# Patient Record
Sex: FEMALE | Race: WHITE | NOT HISPANIC OR LATINO | Employment: OTHER | URBAN - METROPOLITAN AREA
[De-identification: names, ages, dates, MRNs, and addresses within clinical notes are randomized per-mention and may not be internally consistent; named-entity substitution may affect disease eponyms.]

---

## 2017-05-07 ENCOUNTER — APPOINTMENT (OUTPATIENT)
Dept: GENERAL RADIOLOGY | Facility: CLINIC | Age: 37
End: 2017-05-07
Attending: FAMILY MEDICINE
Payer: COMMERCIAL

## 2017-05-07 ENCOUNTER — HOSPITAL ENCOUNTER (EMERGENCY)
Facility: CLINIC | Age: 37
Discharge: HOME | End: 2017-05-07
Attending: FAMILY MEDICINE | Admitting: FAMILY MEDICINE
Payer: COMMERCIAL

## 2017-05-07 VITALS
TEMPERATURE: 98.1 F | SYSTOLIC BLOOD PRESSURE: 163 MMHG | DIASTOLIC BLOOD PRESSURE: 98 MMHG | OXYGEN SATURATION: 99 % | WEIGHT: 209 LBS | RESPIRATION RATE: 16 BRPM

## 2017-05-07 DIAGNOSIS — S63.601A SPRAIN OF RIGHT THUMB, INITIAL ENCOUNTER: ICD-10-CM

## 2017-05-07 DIAGNOSIS — S69.91XA INJURY OF RIGHT HAND, INITIAL ENCOUNTER: ICD-10-CM

## 2017-05-07 DIAGNOSIS — R03.0 ELEVATED BLOOD PRESSURE READING WITHOUT DIAGNOSIS OF HYPERTENSION: ICD-10-CM

## 2017-05-07 PROCEDURE — 99213 OFFICE O/P EST LOW 20 MIN: CPT | Performed by: FAMILY MEDICINE

## 2017-05-07 PROCEDURE — 73130 X-RAY EXAM OF HAND: CPT | Mod: RT

## 2017-05-07 PROCEDURE — 99213 OFFICE O/P EST LOW 20 MIN: CPT

## 2017-05-07 RX ORDER — MELOXICAM 15 MG/1
15 TABLET ORAL DAILY
Qty: 30 TABLET | Refills: 0 | Status: SHIPPED | OUTPATIENT
Start: 2017-05-07 | End: 2017-06-06

## 2017-05-07 NOTE — ED AVS SNAPSHOT
[facility] Emergency Department    [address] [facility] [address]    Phone:  [phone]    Fax:  [phone]                                       Mirian Escalera   MRN: [id number]    Department:  [facility] Emergency Department   Date of Visit:  5/7/2017           Patient Information     Date Of Birth          1980        Your diagnoses for this visit were:     Injury of right hand, initial encounter     Sprain of right thumb, initial encounter     Elevated blood pressure reading without diagnosis of hypertension        You were seen by Fatoumata Reyes MD.      Follow-up Information     Follow up with Other Clinic, Md.    Specialty:  Clinic    Why:  As needed and fpr repeat BP check    Contact information:               Discharge Instructions       (S69.91XA) Injury of right hand, initial encounter  (S60.604O) Sprain of right thumb, initial encounter    Xray negative.   the Meloxicam and take daily with meals, while on this medication, avoid other nsaids (no aspirin, Naproxen, Advil, Alleve). The only over the counter medication you can take with this Meloxicam is Tylenol 1000mg three times a day as needed.  Ice or heat- whichever feels better.  Range of motion exercise.    Follow up as needed but please follow up with regards to your blood pressure which is elevated today.      24 Hour Appointment Hotline       To make an appointment at any [facility], call [phone] ([phone]). If you don't have a family doctor or clinic, we will help you find one. [facility] clinics are conveniently located to serve the needs of you and your family.             Review of your medicines      START taking        Dose / Directions Last dose taken    meloxicam 15 MG tablet   Commonly known as:  MOBIC   Dose:  15 mg   Quantity:  30 tablet        Take 1 tablet (15 mg) by mouth daily   Refills:  0          Our records show that you are taking the medicines listed below. If  these are incorrect, please call your family doctor or clinic.        Dose / Directions Last dose taken    LEVOTHYROXINE SODIUM PO        Take by mouth daily   Refills:  0                Prescriptions were sent or printed at these locations (1 Prescription)                   [facility] - [place], [place] - [address]   [address], [facility] [address]    Telephone:  [phone]   Fax:  [phone]   Hours:                  E-Prescribed (1 of 1)         meloxicam (MOBIC) 15 MG tablet                Procedures and tests performed during your visit     Hand XR, G/E 3 views, right      Orders Needing Specimen Collection     None      Pending Results     No orders found from 5/5/2017 to 5/8/2017.            Pending Culture Results     No orders found from 5/5/2017 to 5/8/2017.            Pending Results Instructions     If you had any lab results that were not finalized at the time of your Discharge, you can call the ED Lab Result RN at [phone]. You will be contacted by this team for any positive Lab results or changes in treatment. The nurses are available 7 days a week from 10A to 6:30P.  You can leave a message 24 hours per day and they will return your call.        Test Results From Your Hospital Stay        5/7/2017  3:14 PM      Narrative     RIGHT HAND 3 VIEWS  5/7/2017 2:47 PM    HISTORY:  Pain.    COMPARISON:  None.    FINDINGS:  No fracture or osseous lesion is seen. The joint spaces are  well preserved. No adjacent soft tissue pathology is seen.        Impression     IMPRESSION:  Unremarkable examination.    MEENAKSHI CARRION MD                Thank you for choosing [facility]       Thank you for choosing [facility] for your care. Our goal is always to provide you with excellent care. Hearing back from our patients is one way we can continue to improve our services. Please take a few minutes to complete the written survey that you may receive  "in the mail after you visit with us. Thank you!        [facility] Information     [employer] lets you send messages to your doctor, view your test results, renew your prescriptions, schedule appointments and more. To sign up, go to [email or web address] . Click on \"Log in\" on the left side of the screen, which will take you to the Welcome page. Then click on \"Sign up Now\" on the right side of the page.     You will be asked to enter the access code listed below, as well as some personal information. Please follow the directions to create your username and password.     Your access code is: [email or web address]  Expires: 2017  3:16 PM     Your access code will  in 90 days. If you need help or a new code, please call your [facility] clinic or [phone].        Care EveryWhere ID     This is your Care EveryWhere ID. This could be used by other organizations to access your [facility] medical records  [id number]        After Visit Summary       This is your record. Keep this with you and show to your community pharmacist(s) and doctor(s) at your next visit.                  "

## 2017-05-07 NOTE — ED AVS SNAPSHOT
[facility] Emergency Department    [address] [address]    Phone:  [phone]    Fax:  [phone]                                       Mirian Escalera   MRN: [id number]    Department:  [facility] Emergency Department   Date of Visit:  5/7/2017           After Visit Summary Signature Page     I have received my discharge instructions, and my questions have been answered. I have discussed any challenges I see with this plan with the nurse or doctor.    ..........................................................................................................................................  Patient/Patient Representative Signature      ..........................................................................................................................................  Patient Representative Print Name and Relationship to Patient    ..................................................               ................................................  Date                                            Time    ..........................................................................................................................................  Reviewed by Signature/Title    ...................................................              ..............................................  Date                                                            Time

## 2017-05-07 NOTE — DISCHARGE INSTRUCTIONS
(S69.91XA) Injury of right hand, initial encounter  (S63.601A) Sprain of right thumb, initial encounter    Xray negative.   the Meloxicam and take daily with meals, while on this medication, avoid other nsaids (no aspirin, Naproxen, Advil, Alleve). The only over the counter medication you can take with this Meloxicam is Tylenol 1000mg three times a day as needed.  Ice or heat- whichever feels better.  Range of motion exercise.    Follow up as needed but please follow up with regards to your blood pressure which is elevated today.

## 2017-05-07 NOTE — ED PROVIDER NOTES
History     Chief Complaint   Patient presents with     Hand Pain     after contact with tree last pm, bent right thumb back     HPI  Mirian Escalera is a 36 year old female who presents for right hand pain which started last night she slipped and caught her thumb on a branch. The thumb was bent backwards. Immediate pain and swelling and bruising especially over her thumb region. Today she noticed a small area that still seemed swollen over her thumb. Currently pain is about 6/10, up to 8/10 once she started to ove it.  She has been icing it, elevating, taking ibuprofen as needed- helped initially with  swelling but she is here as she is still in pain.  No specific injuries to this hand but she reports being accident prone.      Blood pressure noted to be elevated. No diagnosis of hypertension but patient reports that she has white coat hypertension and her PCP has been monitoring this.    I have reviewed the Medications, Allergies, Past Medical and Surgical History, and Social History in the Epic system.    Review of Systems   Pertinent aspects as in the history.     Physical Exam   BP: (!) 163/98  Heart Rate: 77  Temp: 98.1  F (36.7  C)  Resp: 16  Weight: 94.8 kg (209 lb)  SpO2: 99 %  Physical Exam   General - Awake and alert, not in any obvious distress. Afebrile, not pale well hydrated.  Head - Normocephalic, atraumatic.  Musculoskeletal: Upper Extremities - no obvious deformity.  Left upper extremity- essentially normal  Right hand- mild swelling over thenar eminence with associated tenderness over this region, herminio range of motion off all finger though with caution with flexion of thumb due to pain.  Psych - Judgment and mental status are clear, patient has reasonable insight. Mood is stable.        ED Course     ED Course     Procedures            Xray Right hand: IMPRESSION: Unremarkable examination.       Labs Ordered and Resulted from Time of ED Arrival Up to the Time of Departure from the ED - No data  to display    Assessments & Plan (with Medical Decision Making)     I have reviewed the nursing notes.    I have reviewed the findings, diagnosis, plan and need for follow up with the patient.    New Prescriptions    MELOXICAM (MOBIC) 15 MG TABLET    Take 1 tablet (15 mg) by mouth daily       Final diagnoses:   Injury of right hand, initial encounter   Sprain of right thumb, initial encounter       Diagnosis, differential diagnosis, treatment and prognosis discussed with patient. Will follow up with PCp with regards to elevated blood pressure.    See below for summary discussion with patient      the Meloxicam and take daily with meals, while on this medication, avoid other nsaids (no aspirin, Naproxen, Advil, Alleve). The only over the counter medication you can take with this Meloxicam is Tylenol 1000mg three times a day as needed.  Ice or heat- whichever feels better.  Range of motion exercise.    Follow up as needed but please follow up with regards to your blood pressure which is elevated today.    5/7/2017   [facility] EMERGENCY DEPARTMENT     Fatoumata Reyes MD  05/07/17 5430

## 2021-05-28 ENCOUNTER — RECORDS - HEALTHEAST (OUTPATIENT)
Dept: ADMINISTRATIVE | Facility: CLINIC | Age: 41
End: 2021-05-28

## 2021-05-29 ENCOUNTER — RECORDS - HEALTHEAST (OUTPATIENT)
Dept: ADMINISTRATIVE | Facility: CLINIC | Age: 41
End: 2021-05-29

## 2021-06-01 ENCOUNTER — RECORDS - HEALTHEAST (OUTPATIENT)
Dept: ADMINISTRATIVE | Facility: CLINIC | Age: 41
End: 2021-06-01

## 2021-08-24 ENCOUNTER — HOSPITAL ENCOUNTER (EMERGENCY)
Facility: CLINIC | Age: 41
Discharge: HOME | End: 2021-08-24
Attending: FAMILY MEDICINE | Admitting: FAMILY MEDICINE
Payer: COMMERCIAL

## 2021-08-24 VITALS
TEMPERATURE: 98.3 F | HEIGHT: 62 IN | DIASTOLIC BLOOD PRESSURE: 110 MMHG | OXYGEN SATURATION: 95 % | SYSTOLIC BLOOD PRESSURE: 173 MMHG | HEART RATE: 91 BPM | RESPIRATION RATE: 18 BRPM | BODY MASS INDEX: 42.33 KG/M2 | WEIGHT: 230 LBS

## 2021-08-24 DIAGNOSIS — S00.262A INSECT BITE OF LEFT PERIOCULAR AREA, INITIAL ENCOUNTER: ICD-10-CM
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
## 2021-08-24 PROCEDURE — 99284 EMERGENCY DEPT VISIT MOD MDM: CPT | Performed by: FAMILY MEDICINE

## 2021-08-24 PROCEDURE — 99283 EMERGENCY DEPT VISIT LOW MDM: CPT | Performed by: FAMILY MEDICINE

## 2021-08-24 RX ORDER — PREDNISONE 20 MG/1
20 TABLET ORAL 2 TIMES DAILY
Qty: 6 TABLET | Refills: 0 | Status: SHIPPED | OUTPATIENT
Start: 2021-08-24 | End: 2021-08-27

## 2021-08-24 ASSESSMENT — MIFFLIN-ST. JEOR: SCORE: 1661.52

## 2021-08-24 NOTE — ED PROVIDER NOTES
HPI   The patient is a 40-year-old female presenting with concern about a horse fly bite.  She has had similar previous with extensive local reaction.  She was bit 4 days ago.  Location is her left periorbital region, just above the eyebrow.  She has pain but it is not increasing.  She has increased swelling, redness.  No visual changes.  She does have a headache.  No fever.  No nausea or vomiting.  No lightheadedness or fainting.        Allergies:  Allergies   Allergen Reactions     Levofloxacin Unknown     Problem List:    Patient Active Problem List    Diagnosis Date Noted     Migraine Headache      Priority: Medium     Created by Conversion  Replacement Utility updated for latest IMO load         Vitamin D Deficiency      Priority: Medium     Created by Conversion  Replacement Utility updated for latest IMO load         Hyperlipidemia      Priority: Medium     Created by Conversion         Obesity      Priority: Medium     Created by Conversion         Fatigue      Priority: Medium     Created by Conversion          Past Medical History:    No past medical history on file.  Past Surgical History:    Past Surgical History:   Procedure Laterality Date     OTHER SURGICAL HISTORY      none     Family History:    Family History   Problem Relation Age of Onset     Hypertension Mother      Hypertension Father      Heart Disease Maternal Grandfather      Cerebrovascular Disease Mother      Thyroid Disease Mother      Factor V Leiden deficiency Mother      Factor V Leiden deficiency Maternal Grandfather      Social History:  Marital Status:   [2]  Social History     Tobacco Use     Smoking status: Never Smoker   Substance Use Topics     Alcohol use: Yes     Comment: occasional     Drug use: No      Medications:    predniSONE (DELTASONE) 20 MG tablet  LEVOTHYROXINE SODIUM PO  meloxicam (MOBIC) 15 MG tablet      Review of Systems   All other systems reviewed and are negative.      PE   BP: (!) 173/110  Pulse:  "91  Temp: 98.3  F (36.8  C)  Resp: 18  Height: 157.5 cm (5' 2\")  Weight: 104.3 kg (230 lb)  SpO2: 95 %  Physical Exam  Vitals reviewed.   Constitutional:       General: She is not in acute distress.     Appearance: She is well-developed.   HENT:      Head: Normocephalic and atraumatic.      Right Ear: External ear normal.      Left Ear: External ear normal.      Nose: Nose normal.      Mouth/Throat:      Mouth: Mucous membranes are moist.      Pharynx: Oropharynx is clear.   Eyes:      Extraocular Movements: Extraocular movements intact.      Conjunctiva/sclera: Conjunctivae normal.      Pupils: Pupils are equal, round, and reactive to light.   Cardiovascular:      Rate and Rhythm: Normal rate and regular rhythm.   Pulmonary:      Effort: Pulmonary effort is normal.   Musculoskeletal:         General: Normal range of motion.      Cervical back: Normal range of motion.   Skin:     General: Skin is warm and dry.      Comments: The patient has an obvious bite just above the left eyebrow.  There is associated swelling extending down toward the eye and periorbital region.   Neurological:      Mental Status: She is alert and oriented to person, place, and time.   Psychiatric:         Behavior: Behavior normal.         ED COURSE and MDM   0919.  Local swelling and tenderness.  No fever.  Low concern for cellulitis given the timing and lack of systemic symptoms.  She does not have increasing pain or tenderness but it has been constant.  Prednisone 20 mg twice a day for 3 days.  No antibiotics at this time.    LABS  Labs Ordered and Resulted from Time of ED Arrival Up to the Time of Departure from the ED - No data to display    IMAGING  Images reviewed by me.  Radiology report also reviewed.  No orders to display       Procedures    Medications - No data to display      IMPRESSION       ICD-10-CM    1. Insect bite of left periocular area, initial encounter  S00.262A     W57.XXXA             Medication List      Started  "   predniSONE 20 MG tablet  Commonly known as: DELTASONE  20 mg, Oral, 2 TIMES DAILY                          Schuyler Montero MD  08/24/21 0919

## 2021-08-24 NOTE — DISCHARGE INSTRUCTIONS
Return to the Emergency Room if the following occurs:     Fever >101, increasing pain and tenderness, or for any concern at anytime.    Or, follow-up with the following provider as we discussed:     Return to your primary doctor as needed.    Medications discussed:    Prednisone.    If you received pain-relieving or sedating medication during your time in the ER, avoid alcohol, driving automobiles, or working with machinery.  Also, a responsible adult must stay with you.        Call the Nurse Advice Line at [phone] or [phone] for any concern at anytime.

## 2021-10-09 ENCOUNTER — HEALTH MAINTENANCE LETTER (OUTPATIENT)
Age: 41
End: 2021-10-09

## 2022-09-17 ENCOUNTER — HEALTH MAINTENANCE LETTER (OUTPATIENT)
Age: 42
End: 2022-09-17

## 2023-01-23 ENCOUNTER — HEALTH MAINTENANCE LETTER (OUTPATIENT)
Age: 43
End: 2023-01-23

## 2024-02-24 ENCOUNTER — HEALTH MAINTENANCE LETTER (OUTPATIENT)
Age: 44
End: 2024-02-24

## 2024-11-04 NOTE — PROGRESS NOTES
"NEW PATIENT OUTPATIENT VISIT     Chief complaint/Reason for visit: Endometriosis    HPI: 42yo G0 presents to discuss possible endometriosis symptoms and management options.  She reports first noticing some pelvic pain after she had a Kyleena IUD placed in 2018.  IUD has since been removed.  Patient is currently taking Seasonale to suppress her periods though she has been getting more breakthrough spotting in recent months.  She describes pain that worsens around the time she \"should\" be getting her period but she really has pain all the time.  She describes lower abdominal pain that shoots into her bladder, uterus, and rectum.  She also reports pain during and after intercourse.  She also reports nausea and bloating at times.    Patient reports a strong family history of endometriosis-- mother and sister.  Mother had hyst at patient's age.  Sister with hyst age 30s.    OBHx: G0  Gyn Hx: No abnormal Paps.  No other Gyn issues prior to this.    PMH: Fatty liver, HTN.    Last Pap 12/3/21 NIL, HPV negative.  ------------------------------------------------------------------------  Review of Systems:     With the exception of any items noted in HPI, the remainder of the GI and  ROS is negative.    ------------------------------------------------------------------------    The medical, surgical, social and family histories were reviewed and updated.     ------------------------------------------------------------------------  Physical Exam:    Constitutional:   - BP (!) 134/100 (BP Location: Left arm, Patient Position: Sitting, Cuff Size: Adult Large)   Pulse 98   Temp 99  F (37.2  C)   Ht 1.575 m (5' 2\")   Wt 97 kg (213 lb 14.4 oz)   BMI 39.12 kg/m    - General Appearance: pleasant, well-appearing, NAD    Cardiovascular:  - Extremities: no edema or calf tenderness    Genitourinary/Female Genitalia:  - EGBUS: normal-appearing external genitalia and perineum  - Vagina: normal vaginal mucosa and discharge  - Cervix: " without lesions.  Pap sent.  - Bladder/Urethra: nontender, without masses  - Uterus: midline, mobile, nontender, not enlarged  - Adnexa: no adnexal masses or tenderness appreciated    Abdomen/GI:  - Perineum/Anus/Rectum: normal appearing, no hemorrhoids    ------------------------------------------------------------------------  Labs/Studies Reviewed:     10/1/24 Pelvic US:     Impression    1.  Cervical canal appears somewhat heterogeneous without increased color Doppler vascularity, suggestive of the presence of a small amount of blood products. Clinical correlation recommended.  2.  Otherwise normal examination.  Narrative    For Patients: As a result of the 21st Century Cures Act, medical imaging exams and procedure reports are released immediately into your electronic medical record. You may view this report before your referring provider. If you have questions, please contact your health care provider.    EXAM: US PELVIS COMPLETE TA AND TV WITH DUPLEX  [facility]  DATE: 10/1/2024    INDICATION: Pelvic Pain  COMPARISON: 10/24/2018.  TECHNIQUE: Transabdominal scans were performed. Endovaginal ultrasound was performed to better visualize the adnexa. Color flow with spectral Doppler and waveform analysis performed.    FINDINGS:    UTERUS: 8.3 x 5.6 x 4.6 cm. Normal in size and position with no masses.    ENDOMETRIUM: 2 mm. Normal smooth endometrium. Cervical canal measures up to 7 mm wide and has somewhat heterogeneous texture without increased color Doppler vascularity, findings suggestive of the presence of a small amount of blood products.    RIGHT OVARY: 3.4 x 2.7 x 2.3 cm. 2.4 cm simple cyst consistent with a benign dominant follicle. Normal arterial and venous duplex flow identified.    LEFT OVARY: 1.9 x 1.8 x 1.4 cm. Normal with arterial and venous duplex flow identified.    Small amount of free pelvic fluid within physiologic range.    10/24/2018 Pelvic US:      Examination:  Ultrasound pelvis complete TA and TV     Indication: Pelvic pain in female     Technique:   Routine transabdominal and transvaginal pelvic sonographic examination with color and Doppler flow where appropriate.     Comparison: Pelvic ultrasound from 7/10/2018.     Findings:    Uterus Size:  9.1 x 3.3 x 4.3 cm.   Uterus: Mildly heterogeneous in appearance with a 0.6 x 0.6 x 0.9 cm posterior fundal fibroid, similar to prior exam.   Endometrial thickness : The thickness measures approximately 1.2 cm, within normal limits in a patient of this age. Echogenic shadowing IUD identified in appropriate position.     Right ovary size:  2.7 x 1.5 x 1.6 cm.   Right adnexa: Contains a 2.1 x 1.8 x 1.6 cm right-sided paraovarian cyst, previously measuring 1.9 x 1.7 x 1.9 cm.     Left ovary size:  3.2 x 2.2 x 2.0 cm.   Left ovary: Contains a 1.6 x 1.9 x 1.4 cm left ovarian cyst with smaller daughter cyst within it.     Free fluid: None.     Impression:    Right-sided paraovarian cyst as above.   Endometrial stripe is within normal limits with IUD in place.     ------------------------------------------------------------------------  Assessment/Plan:  43 year old with chronic pelvic pain and strong family history of endometriosis.  -- Pain inadequately controlled with COCs.  -- Discussed alternative options for medical management of symptoms:      1) Progesterone only methods: had pain with Kyleena IUD so not a good candidate for another IUD.  Discussed POPs.       2) Orilissa-- reviewed mechanism of action, duration of use, potential side effects.  -- Discussed definitive surgical management with hysterectomy.  Patient has been thinking about this and would like to proceed.  Discussed hysterectomy with bilateral salpingectomy, +/- bilateral oophorectomy (reviewed pros/cons and she is leaning towards removal of ovaries).  -- Discussed with patient that I am not currently operating, referred her to one of my partners.  Patient is  traveling to [place] on January 19 for 3 weeks so will likely schedule procedure for after she returns.  -- HTN: Patient with history of white coat HTN so Bps always worse in clinic.  She is currently being managed by [facility] PCP for HTN and has recently been started on medication.  Has follow up scheduled with PCP within 2 weeks.  I discussed that I would normally recommend discontinuing estrogen containing pills with poorly controlled HTN due to stroke risk.  However, I believe there is a high risk of worsening patient's pelvic pain if I try to switch her off COCs at this time.  I emphasized to the patient that if she continues COCs, it is extremely important that she monitors her BP at home and has close follow up with her PCP to ensure BP remains within target range.  If BP ends up being difficult to manage I would recommend stopping COCs and switching over to a progesterone only pill to cover her until hysterectomy can be performed.  Patient expressed understanding.  -- Pap sent today.    Geneva Whyte MD

## 2024-11-05 ENCOUNTER — OFFICE VISIT (OUTPATIENT)
Dept: OBGYN | Facility: CLINIC | Age: 44
End: 2024-11-05
Payer: COMMERCIAL

## 2024-11-05 VITALS
TEMPERATURE: 99 F | HEART RATE: 98 BPM | HEIGHT: 62 IN | BODY MASS INDEX: 39.36 KG/M2 | WEIGHT: 213.9 LBS | SYSTOLIC BLOOD PRESSURE: 134 MMHG | DIASTOLIC BLOOD PRESSURE: 100 MMHG

## 2024-11-05 DIAGNOSIS — Z12.4 CERVICAL CANCER SCREENING: ICD-10-CM

## 2024-11-05 DIAGNOSIS — N80.9 ENDOMETRIOSIS: ICD-10-CM

## 2024-11-05 DIAGNOSIS — E66.01 CLASS 2 SEVERE OBESITY DUE TO EXCESS CALORIES WITH SERIOUS COMORBIDITY AND BODY MASS INDEX (BMI) OF 39.0 TO 39.9 IN ADULT (H): ICD-10-CM

## 2024-11-05 DIAGNOSIS — G89.29 CHRONIC PELVIC PAIN IN FEMALE: Primary | ICD-10-CM

## 2024-11-05 DIAGNOSIS — I10 HYPERTENSION, UNSPECIFIED TYPE: ICD-10-CM

## 2024-11-05 DIAGNOSIS — E66.812 CLASS 2 SEVERE OBESITY DUE TO EXCESS CALORIES WITH SERIOUS COMORBIDITY AND BODY MASS INDEX (BMI) OF 39.0 TO 39.9 IN ADULT (H): ICD-10-CM

## 2024-11-05 DIAGNOSIS — R10.2 CHRONIC PELVIC PAIN IN FEMALE: Primary | ICD-10-CM

## 2024-11-05 LAB
HPV HR 12 DNA CVX QL NAA+PROBE: NEGATIVE
HPV16 DNA CVX QL NAA+PROBE: NEGATIVE
HPV18 DNA CVX QL NAA+PROBE: NEGATIVE
HUMAN PAPILLOMA VIRUS FINAL DIAGNOSIS: NORMAL

## 2024-11-05 PROCEDURE — 87624 HPV HI-RISK TYP POOLED RSLT: CPT | Performed by: OBSTETRICS & GYNECOLOGY

## 2024-11-05 PROCEDURE — G0145 SCR C/V CYTO,THINLAYER,RESCR: HCPCS | Performed by: OBSTETRICS & GYNECOLOGY

## 2024-11-05 PROCEDURE — 99459 PELVIC EXAMINATION: CPT | Performed by: OBSTETRICS & GYNECOLOGY

## 2024-11-05 PROCEDURE — 99203 OFFICE O/P NEW LOW 30 MIN: CPT | Performed by: OBSTETRICS & GYNECOLOGY

## 2024-11-05 RX ORDER — DESOGESTREL AND ETHINYL ESTRADIOL 0.15-0.03
1 KIT ORAL DAILY
COMMUNITY

## 2024-11-05 RX ORDER — SUMATRIPTAN 50 MG/1
50 TABLET, FILM COATED ORAL
COMMUNITY
Start: 2024-01-09

## 2024-11-08 LAB
BKR AP ASSOCIATED HPV REPORT: NORMAL
BKR LAB AP GYN ADEQUACY: NORMAL
BKR LAB AP GYN INTERPRETATION: NORMAL
BKR LAB AP PREVIOUS ABNORMAL: NORMAL
PATH REPORT.COMMENTS IMP SPEC: NORMAL
PATH REPORT.COMMENTS IMP SPEC: NORMAL
PATH REPORT.RELEVANT HX SPEC: NORMAL

## 2024-12-09 ENCOUNTER — TELEPHONE (OUTPATIENT)
Dept: OBGYN | Facility: CLINIC | Age: 44
End: 2024-12-09

## 2024-12-09 ENCOUNTER — PREP FOR PROCEDURE (OUTPATIENT)
Dept: OBGYN | Facility: CLINIC | Age: 44
End: 2024-12-09

## 2024-12-09 ENCOUNTER — OFFICE VISIT (OUTPATIENT)
Dept: OBGYN | Facility: CLINIC | Age: 44
End: 2024-12-09
Payer: COMMERCIAL

## 2024-12-09 VITALS
WEIGHT: 215.1 LBS | SYSTOLIC BLOOD PRESSURE: 126 MMHG | DIASTOLIC BLOOD PRESSURE: 72 MMHG | RESPIRATION RATE: 16 BRPM | TEMPERATURE: 97.8 F | HEIGHT: 62 IN | BODY MASS INDEX: 39.58 KG/M2

## 2024-12-09 DIAGNOSIS — N93.9 ABNORMAL UTERINE BLEEDING: ICD-10-CM

## 2024-12-09 DIAGNOSIS — I10 HYPERTENSION, UNSPECIFIED TYPE: Primary | ICD-10-CM

## 2024-12-09 DIAGNOSIS — N80.9 ENDOMETRIOSIS: Primary | ICD-10-CM

## 2024-12-09 LAB
HCG UR QL: NEGATIVE
INTERNAL QC OK POCT: NORMAL
POCT KIT EXPIRATION DATE: NORMAL
POCT KIT LOT NUMBER: NORMAL

## 2024-12-09 PROCEDURE — 88305 TISSUE EXAM BY PATHOLOGIST: CPT | Performed by: PATHOLOGY

## 2024-12-09 PROCEDURE — 99459 PELVIC EXAMINATION: CPT | Performed by: OBSTETRICS & GYNECOLOGY

## 2024-12-09 PROCEDURE — 99215 OFFICE O/P EST HI 40 MIN: CPT | Mod: 25 | Performed by: OBSTETRICS & GYNECOLOGY

## 2024-12-09 PROCEDURE — 81025 URINE PREGNANCY TEST: CPT | Performed by: OBSTETRICS & GYNECOLOGY

## 2024-12-09 PROCEDURE — 58100 BIOPSY OF UTERUS LINING: CPT | Performed by: OBSTETRICS & GYNECOLOGY

## 2024-12-09 RX ORDER — LOSARTAN POTASSIUM 50 MG/1
50 TABLET ORAL DAILY
COMMUNITY

## 2024-12-09 RX ORDER — LEVONORGESTREL AND ETHINYL ESTRADIOL 0.15-0.03
1 KIT ORAL DAILY
COMMUNITY

## 2024-12-09 NOTE — PROGRESS NOTES
"Initial /72 (BP Location: Right arm, Patient Position: Chair, Cuff Size: Adult Regular)   Temp 97.8  F (36.6  C) (Tympanic)   Resp 16   Ht 1.575 m (5' 2\")   Wt 97.6 kg (215 lb 1.6 oz)   LMP  (LMP Unknown)   BMI 39.34 kg/m   Estimated body mass index is 39.34 kg/m  as calculated from the following:    Height as of this encounter: 1.575 m (5' 2\").    Weight as of this encounter: 97.6 kg (215 lb 1.6 oz). .  "

## 2024-12-09 NOTE — PROGRESS NOTES
[facility]  OB/GYN Clinic   Gynecology Consult Note    CC:     Chief Complaint   Patient presents with    Consult     Hysterectomy w/ suspected endometriosis       HPI: Ms. Arreguin is a 44 year old G0 with AUB and dysmenorrhea.   Has significant AUB and dysmenorrhea for years. See initial consult with my colleague Dr. Whyte.   Kyleena - breakthrough bleeding  Currently using COCPs, getting breakthrough spotting. Pelvic pain in lower abdominal, has pain that shoots into her vagina , bladder uterus and rectum. Pain during and after intercourse.     Has random nausea.     GYN Hx: Patient reports a strong family history of endometriosis-- mother and sister.  Mother had hyst at patient's age.  Sister with hyst age 30s.    Pt also desires hysterectomy.      OBHx: G0  Gyn Hx: No abnormal Paps.  No other Gyn issues prior to this.      ROS: A 10 pt ROS was completed and found to be otherwise negative unless mentioned in the HPI.     PMH:   HTN  Hypothyroidism   Migraine hx     PSHx:   Past Surgical History:   Procedure Laterality Date    OTHER SURGICAL HISTORY      none       OBHx:   OB History   No obstetric history on file.   G0    Medications:   Current Outpatient Medications   Medication Sig Dispense Refill    LEVOTHYROXINE SODIUM PO Take by mouth daily      losartan (COZAAR) 50 MG tablet Take 50 mg by mouth daily.      SETLAKIN 0.15-0.03 MG tablet Take 1 tablet by mouth daily.      SUMAtriptan (IMITREX) 50 MG tablet Take 50 mg by mouth at onset of headache.      meloxicam (MOBIC) 15 MG tablet Take 1 tablet (15 mg) by mouth daily 30 tablet 0     No current facility-administered medications for this visit.       Allergies:      Allergies   Allergen Reactions    Levofloxacin Unknown       Social History:  Stays at home   Social History     Socioeconomic History    Marital status:      Spouse name: Not on file    Number of children: Not on file    Years of education: Not on file    Highest  education level: Not on file   Occupational History    Not on file   Tobacco Use    Smoking status: Never    Smokeless tobacco: Not on file   Vaping Use    Vaping status: Never Used   Substance and Sexual Activity    Alcohol use: Yes     Comment: occasional    Drug use: No    Sexual activity: Not on file   Other Topics Concern    Not on file   Social History Narrative    Not on file     Social Drivers of Health     Financial Resource Strain: High Risk (1/1/2022)    [facility]    Financial Resource Strain     Difficulty of Paying Living Expenses: Not on file     Difficulty of Paying Living Expenses: Not on file   Food Insecurity: Not on file   Transportation Needs: Not on file   Physical Activity: Not on file   Stress: Not on file   Social Connections: Not on file   Interpersonal Safety: Not on file   Housing Stability: Not on file     Social History     Socioeconomic History    Marital status:      Spouse name: None    Number of children: None    Years of education: None    Highest education level: None   Tobacco Use    Smoking status: Never   Vaping Use    Vaping status: Never Used   Substance and Sexual Activity    Alcohol use: Yes     Comment: occasional    Drug use: No     Social Drivers of Health      Received from [facility]    Financial Resource Strain       Family History:   Family History   Problem Relation Age of Onset    Hypertension Mother     Cerebrovascular Disease Mother     Thyroid Disease Mother     Factor V Leiden deficiency Mother     Endometriosis Mother     Hypertension Father     Heart Disease Maternal Grandfather     Factor V Leiden deficiency Maternal Grandfather     Endometriosis Sister        Physical Exam:   Vitals:    12/09/24 1024   BP: 126/72   BP Location: Right arm   Patient Position: Chair   Cuff Size: Adult Regular   Resp: 16   Temp: 97.8  F (36.6  C)   TempSrc: Tympanic   Weight: 97.6 kg (215  "lb 1.6 oz)   Height: 1.575 m (5' 2\")      Estimated body mass index is 39.34 kg/m  as calculated from the following:    Height as of this encounter: 1.575 m (5' 2\").    Weight as of this encounter: 97.6 kg (215 lb 1.6 oz).    Gen: Pleasant, talkative female in no apparent distress   Respiratory: breathing comfortably on room air   Cardiac: Regular rate, warm and well-perfused.   GI: Abd soft and non-tender  : External genitalia is free of lesion. Urethra and bartholin glands normal.  Vaginal mucosa is moist and pink without unusual discharge.  Cervix is without lesions or discharge. Bimanual exam reveals mobile anteverted uterus without cervical motion tenderness.  Adenexa are mobile and non-tender bilaterally. No appreciable adnexal enlargement.   Rectal: no masses or hemorrhoids visually appreciated  Derm: no acanthosis nigricans, ance or facial/back/abdominal hair growth patterns   MSK: Grossly normal movement of all four extremities  Psych: mood and affect bright   Lower extremity: edema not present     Labs/Imaging:   EXAM: US PELVIS COMPLETE TA AND TV WITH DUPLEX   LOCATION: [facility]   DATE: 10/1/2024     INDICATION: Pelvic Pain   COMPARISON: 10/24/2018.   TECHNIQUE: Transabdominal scans were performed. Endovaginal ultrasound was performed to better visualize the adnexa. Color flow with spectral Doppler and waveform analysis performed.     FINDINGS:     UTERUS: 8.3 x 5.6 x 4.6 cm. Normal in size and position with no masses.     ENDOMETRIUM: 2 mm. Normal smooth endometrium. Cervical canal measures up to 7 mm wide and has somewhat heterogeneous texture without increased color Doppler vascularity, findings suggestive of the presence of a small amount of blood products.     RIGHT OVARY: 3.4 x 2.7 x 2.3 cm. 2.4 cm simple cyst consistent with a benign dominant follicle. Normal arterial and venous duplex flow identified.     LEFT OVARY: 1.9 x 1.8 x 1.4 cm. Normal with arterial and venous " duplex flow identified.       A&P: 43 yo G0 who presents to discussed dysmenorrhea and abnormal uterine bleeding (breakthrough bleeding on COCPs).   Discussed that her clinical picture is certainly consistent with endometriosis. Discussed definitive diagnosis with diagnostic laparoscopy with pathologic diagnosis. Discussed my typical practice of empiric treatment prior to laparoscopy.   Discussed medical treatment options including depo provera, OCPs, hormonal patch and hormonal ring including side effect profile and bleeding patterns. I discussed LARC options of Mirena IUD and the Nexplanon. Discussed placement, expected bleeding profiles, side effect profile, efficacy as contraception and reversibility. Pt has HTN, so would recommend any estrogen-containing treatments, so needs to get off COCPs. I discussed depo lupron and orlissa including pathophysiology and need for add back therapy.     Additionally, I reviewed the option of definitive treatment with a hysterectomy. I discussed my typical approach of minimally-invasive surgery, and that I think she would be a candidate for a laparoscopic-assisted vaginal hysterectomy, bilateral salpingectomy and cystoscopy. I reviewed oophorectomy versus ovarian conservation. I discussed the risks of re-operation with endometriosis versus the risks to bone and heart health with oophorectomy. I discussed the steps of the procedure in detail as well as the expected recovery. Discussed same day surgery expectations. Additionally, I reviewed the risks of bleeding, infection and recognized and unrecognized intraabdominal injury. I discussed the risk of needing laparoscopy or laparotomy. Discussed need for pre-op clearance.     After this discussion, the patient would like to proceed with  laparoscopic-assisted vaginal hysterectomy, bilateral salpingectomy and cystoscopy.     Recommended endometrial biopsy to rule out hyperplasia or malignancy given breakthrough bleeding on OCPs and  obesity risk factor.     [facility] Endometrial Biopsy Procedure Note    Mirian Escalera  1980  [id number]    The patient was counseled on the risks (including risk of infection, bleeding, pain). Verbal and written consent were obtained.     Technique: The patient was placed in the dorsal lithotomy position.  A speculum was placed in the vagina and the cervix visualized. The cervix was cleaned with betadine swabs x3. The rocket curet was passed through the cervix to the fundus with return of scant but adequate tissue. This was placed in specimen jar and sent for permanent pathology. All instruments were removed.  The patient tolerated the procedure well.      I spent 40 min with the patient and this does not include the time for the procedure. I spent an additional 10 min in documentation and chart review.     Randee Ohara MD  OB/GYN  12/9/2024

## 2024-12-09 NOTE — TELEPHONE ENCOUNTER
"[id number]  Mirian Escalera    You are now scheduled for surgery at [facility].  Below are the details for your surgery.  Please read the \"Preparing for Your Surgery\" instructions and let us know if you have any questions.    Type of surgery: HYSTERECTOMY, VAGINAL, LAPAROSCOPY-ASSISTED, bilateral salpingectomy, removal of endometriosis, cystoscopy   Surgeon:  Randee Ohara MD  Location of surgery: [facility] OR    Date of surgery: 2/13/25    Time: 7:30am   Arrival Time: 6:00am    Time can change, to be confirmed a couple of days prior by pre-op surgery nurse.    Pre-Op Appt Date: Patient to schedule with a PCP or Family Practice Provider within 30 days to the surgery.  Post-Op Appt Date: 4weeks   Time:     Packet sent out: Yes  Pre-cert/Authorization completed:  TBD by Financial Securing Office.   MA Sterilization/Hysterectomy Acknowledgment Consent signed: Not Applicable    [facility]  [phone]    Fax: [phone]  Same Day Surgery [phone]  Fax: [phone]  [facility] [phone]    "

## 2024-12-12 LAB
PATH REPORT.COMMENTS IMP SPEC: NORMAL
PATH REPORT.COMMENTS IMP SPEC: NORMAL
PATH REPORT.FINAL DX SPEC: NORMAL
PATH REPORT.GROSS SPEC: NORMAL
PATH REPORT.MICROSCOPIC SPEC OTHER STN: NORMAL
PATH REPORT.RELEVANT HX SPEC: NORMAL
PHOTO IMAGE: NORMAL

## 2025-02-12 ENCOUNTER — ANESTHESIA EVENT (OUTPATIENT)
Dept: SURGERY | Facility: CLINIC | Age: 45
End: 2025-02-12
Payer: COMMERCIAL

## 2025-02-12 NOTE — ANESTHESIA PREPROCEDURE EVALUATION
"Anesthesia Pre-Procedure Evaluation    Patient: Mirian Escalera   MRN: [id number] : 1980        Procedure : Procedure(s):  HYSTERECTOMY, VAGINAL, LAPAROSCOPY-ASSISTED, bilateral salpingectomy, removal of endometriosis, cystoscopy          No past medical history on file.   Past Surgical History:   Procedure Laterality Date    OTHER SURGICAL HISTORY      none      Allergies   Allergen Reactions    Levofloxacin Unknown      Social History     Tobacco Use    Smoking status: Never    Smokeless tobacco: Not on file   Substance Use Topics    Alcohol use: Yes     Comment: occasional      Wt Readings from Last 1 Encounters:   24 97.6 kg (215 lb 1.6 oz)        Anesthesia Evaluation            ROS/MED HX  ENT/Pulmonary:       Neurologic: Comment: Migraine with aura and without status migrainosus, not intractable    (+)      migraines,                          Cardiovascular:     (+) Dyslipidemia hypertension- -   -  - -                                      METS/Exercise Tolerance:     Hematologic:       Musculoskeletal:       GI/Hepatic: Comment: Fatty liver disease    (+)             liver disease,       Renal/Genitourinary:       Endo:     (+)          thyroid problem, hypothyroidism,    Obesity,       Psychiatric/Substance Use:       Infectious Disease:       Malignancy:       Other:            Physical Exam    Airway  airway exam normal      Mallampati: II   TM distance: > 3 FB   Neck ROM: full   Mouth opening: > 3 cm    Respiratory Devices and Support         Dental       (+) Completely normal teeth      Cardiovascular   cardiovascular exam normal       Rhythm and rate: regular and normal     Pulmonary   pulmonary exam normal        breath sounds clear to auscultation           OUTSIDE LABS:  CBC: No results found for: \"WBC\", \"HGB\", \"HCT\", \"PLT\"  BMP: No results found for: \"NA\", \"POTASSIUM\", \"CHLORIDE\", \"CO2\", \"BUN\", \"CR\", \"GLC\"  COAGS: No results found for: \"PTT\", \"INR\", \"FIBR\"  POC:   Lab Results " "  Component Value Date    HCG Negative 12/09/2024     HEPATIC: No results found for: \"ALBUMIN\", \"PROTTOTAL\", \"ALT\", \"AST\", \"GGT\", \"ALKPHOS\", \"BILITOTAL\", \"BILIDIRECT\", \"ANJELICA\"  OTHER:   Lab Results   Component Value Date    A1C 5.1 10/26/2010       Anesthesia Plan    ASA Status:  2    NPO Status:  NPO Appropriate    Anesthesia Type: General.     - Airway: ETT   Induction: Intravenous, Propofol.   Maintenance: TIVA.        Consents    Anesthesia Plan(s) and associated risks, benefits, and realistic alternatives discussed. Questions answered and patient/representative(s) expressed understanding.     - Discussed: Risks, Benefits and Alternatives for BOTH SEDATION and the PROCEDURE were discussed     - Discussed with:  Patient      - Extended Intubation/Ventilatory Support Discussed: No.      - Patient is DNR/DNI Status: No     Use of blood products discussed: Yes.     - Discussed with: Patient.     Postoperative Care       PONV prophylaxis: Ondansetron (or other 5HT-3), Dexamethasone or Solumedrol, Background Propofol Infusion     Comments:               LYDNA Metz CRNA    I have reviewed the pertinent notes and labs in the chart from the past 30 days and (re)examined the patient.  Any updates or changes from those notes are reflected in this note.    Clinically Significant Risk Factors Present on Admission                                          "

## 2025-02-13 ENCOUNTER — HOSPITAL ENCOUNTER (OUTPATIENT)
Facility: CLINIC | Age: 45
Discharge: HOME | End: 2025-02-13
Attending: OBSTETRICS & GYNECOLOGY | Admitting: OBSTETRICS & GYNECOLOGY
Payer: COMMERCIAL

## 2025-02-13 ENCOUNTER — ANESTHESIA (OUTPATIENT)
Dept: SURGERY | Facility: CLINIC | Age: 45
End: 2025-02-13
Payer: COMMERCIAL

## 2025-02-13 VITALS
OXYGEN SATURATION: 98 % | DIASTOLIC BLOOD PRESSURE: 99 MMHG | RESPIRATION RATE: 16 BRPM | SYSTOLIC BLOOD PRESSURE: 144 MMHG | HEART RATE: 95 BPM | TEMPERATURE: 98.6 F

## 2025-02-13 DIAGNOSIS — Z90.710 S/P LAPAROSCOPIC ASSISTED VAGINAL HYSTERECTOMY (LAVH): Primary | ICD-10-CM

## 2025-02-13 LAB
ABO + RH BLD: NORMAL
ANION GAP SERPL CALCULATED.3IONS-SCNC: 18 MMOL/L (ref 7–15)
BASOPHILS # BLD AUTO: 0 10E3/UL (ref 0–0.2)
BASOPHILS NFR BLD AUTO: 0 %
BLD GP AB SCN SERPL QL: NEGATIVE
BUN SERPL-MCNC: 8.8 MG/DL (ref 6–20)
CALCIUM SERPL-MCNC: 9.4 MG/DL (ref 8.8–10.4)
CHLORIDE SERPL-SCNC: 101 MMOL/L (ref 98–107)
CREAT SERPL-MCNC: 1.04 MG/DL (ref 0.51–0.95)
EGFRCR SERPLBLD CKD-EPI 2021: 67 ML/MIN/1.73M2
EOSINOPHIL # BLD AUTO: 0 10E3/UL (ref 0–0.7)
EOSINOPHIL NFR BLD AUTO: 0 %
ERYTHROCYTE [DISTWIDTH] IN BLOOD BY AUTOMATED COUNT: 13 % (ref 10–15)
GLUCOSE SERPL-MCNC: 103 MG/DL (ref 70–99)
HCG UR QL: NEGATIVE
HCO3 SERPL-SCNC: 17 MMOL/L (ref 22–29)
HCT VFR BLD AUTO: 44.6 % (ref 35–47)
HGB BLD-MCNC: 15.2 G/DL (ref 11.7–15.7)
IMM GRANULOCYTES # BLD: 0 10E3/UL
IMM GRANULOCYTES NFR BLD: 0 %
LYMPHOCYTES # BLD AUTO: 2 10E3/UL (ref 0.8–5.3)
LYMPHOCYTES NFR BLD AUTO: 20 %
MCH RBC QN AUTO: 29.5 PG (ref 26.5–33)
MCHC RBC AUTO-ENTMCNC: 34.1 G/DL (ref 31.5–36.5)
MCV RBC AUTO: 87 FL (ref 78–100)
MONOCYTES # BLD AUTO: 0.8 10E3/UL (ref 0–1.3)
MONOCYTES NFR BLD AUTO: 8 %
NEUTROPHILS # BLD AUTO: 7 10E3/UL (ref 1.6–8.3)
NEUTROPHILS NFR BLD AUTO: 70 %
NRBC # BLD AUTO: 0 10E3/UL
NRBC BLD AUTO-RTO: 0 /100
PLATELET # BLD AUTO: 261 10E3/UL (ref 150–450)
POTASSIUM SERPL-SCNC: 3.9 MMOL/L (ref 3.4–5.3)
RBC # BLD AUTO: 5.15 10E6/UL (ref 3.8–5.2)
SODIUM SERPL-SCNC: 136 MMOL/L (ref 135–145)
SPECIMEN EXP DATE BLD: NORMAL
WBC # BLD AUTO: 9.9 10E3/UL (ref 4–11)

## 2025-02-13 PROCEDURE — 88307 TISSUE EXAM BY PATHOLOGIST: CPT | Mod: 26 | Performed by: PATHOLOGY

## 2025-02-13 PROCEDURE — 250N000011 HC RX IP 250 OP 636: Mod: JZ | Performed by: OBSTETRICS & GYNECOLOGY

## 2025-02-13 PROCEDURE — 258N000003 HC RX IP 258 OP 636: Performed by: NURSE ANESTHETIST, CERTIFIED REGISTERED

## 2025-02-13 PROCEDURE — 86901 BLOOD TYPING SEROLOGIC RH(D): CPT | Performed by: OBSTETRICS & GYNECOLOGY

## 2025-02-13 PROCEDURE — 370N000017 HC ANESTHESIA TECHNICAL FEE, PER MIN: Performed by: OBSTETRICS & GYNECOLOGY

## 2025-02-13 PROCEDURE — 58552 LAPARO-VAG HYST INCL T/O: CPT | Performed by: OBSTETRICS & GYNECOLOGY

## 2025-02-13 PROCEDURE — 250N000009 HC RX 250: Performed by: OBSTETRICS & GYNECOLOGY

## 2025-02-13 PROCEDURE — 58552 LAPARO-VAG HYST INCL T/O: CPT | Mod: AS | Performed by: PHYSICIAN ASSISTANT

## 2025-02-13 PROCEDURE — 710N000012 HC RECOVERY PHASE 2, PER MINUTE: Performed by: OBSTETRICS & GYNECOLOGY

## 2025-02-13 PROCEDURE — 81025 URINE PREGNANCY TEST: CPT | Performed by: OBSTETRICS & GYNECOLOGY

## 2025-02-13 PROCEDURE — 271N000001 HC OR GENERAL SUPPLY NON-STERILE: Performed by: OBSTETRICS & GYNECOLOGY

## 2025-02-13 PROCEDURE — 250N000011 HC RX IP 250 OP 636: Performed by: OBSTETRICS & GYNECOLOGY

## 2025-02-13 PROCEDURE — 88307 TISSUE EXAM BY PATHOLOGIST: CPT | Mod: TC | Performed by: OBSTETRICS & GYNECOLOGY

## 2025-02-13 PROCEDURE — 250N000011 HC RX IP 250 OP 636: Performed by: NURSE ANESTHETIST, CERTIFIED REGISTERED

## 2025-02-13 PROCEDURE — 250N000013 HC RX MED GY IP 250 OP 250 PS 637: Performed by: OBSTETRICS & GYNECOLOGY

## 2025-02-13 PROCEDURE — 710N000009 HC RECOVERY PHASE 1, LEVEL 1, PER MIN: Performed by: OBSTETRICS & GYNECOLOGY

## 2025-02-13 PROCEDURE — 250N000009 HC RX 250: Performed by: NURSE ANESTHETIST, CERTIFIED REGISTERED

## 2025-02-13 PROCEDURE — 360N000077 HC SURGERY LEVEL 4, PER MIN: Performed by: OBSTETRICS & GYNECOLOGY

## 2025-02-13 PROCEDURE — 250N000013 HC RX MED GY IP 250 OP 250 PS 637: Performed by: NURSE ANESTHETIST, CERTIFIED REGISTERED

## 2025-02-13 PROCEDURE — 272N000001 HC OR GENERAL SUPPLY STERILE: Performed by: OBSTETRICS & GYNECOLOGY

## 2025-02-13 PROCEDURE — 250N000011 HC RX IP 250 OP 636

## 2025-02-13 PROCEDURE — 82947 ASSAY GLUCOSE BLOOD QUANT: CPT | Performed by: OBSTETRICS & GYNECOLOGY

## 2025-02-13 PROCEDURE — 999N000141 HC STATISTIC PRE-PROCEDURE NURSING ASSESSMENT: Performed by: OBSTETRICS & GYNECOLOGY

## 2025-02-13 PROCEDURE — 36415 COLL VENOUS BLD VENIPUNCTURE: CPT | Performed by: OBSTETRICS & GYNECOLOGY

## 2025-02-13 PROCEDURE — 85025 COMPLETE CBC W/AUTO DIFF WBC: CPT | Performed by: OBSTETRICS & GYNECOLOGY

## 2025-02-13 RX ORDER — LIDOCAINE 40 MG/G
CREAM TOPICAL
Status: DISCONTINUED | OUTPATIENT
Start: 2025-02-13 | End: 2025-02-13 | Stop reason: HOSPADM

## 2025-02-13 RX ORDER — FENTANYL CITRATE 50 UG/ML
INJECTION, SOLUTION INTRAMUSCULAR; INTRAVENOUS PRN
Status: DISCONTINUED | OUTPATIENT
Start: 2025-02-13 | End: 2025-02-13

## 2025-02-13 RX ORDER — HYDROMORPHONE HCL IN WATER/PF 6 MG/30 ML
0.4 PATIENT CONTROLLED ANALGESIA SYRINGE INTRAVENOUS EVERY 5 MIN PRN
Status: DISCONTINUED | OUTPATIENT
Start: 2025-02-13 | End: 2025-02-13 | Stop reason: HOSPADM

## 2025-02-13 RX ORDER — CEFAZOLIN SODIUM/WATER 2 G/20 ML
2 SYRINGE (ML) INTRAVENOUS
Status: COMPLETED | OUTPATIENT
Start: 2025-02-13 | End: 2025-02-13

## 2025-02-13 RX ORDER — ACETAMINOPHEN 325 MG/1
975 TABLET ORAL ONCE
Status: DISCONTINUED | OUTPATIENT
Start: 2025-02-13 | End: 2025-02-13 | Stop reason: HOSPADM

## 2025-02-13 RX ORDER — PROPOFOL 10 MG/ML
INJECTION, EMULSION INTRAVENOUS CONTINUOUS PRN
Status: DISCONTINUED | OUTPATIENT
Start: 2025-02-13 | End: 2025-02-13

## 2025-02-13 RX ORDER — CEFAZOLIN SODIUM/WATER 2 G/20 ML
2 SYRINGE (ML) INTRAVENOUS SEE ADMIN INSTRUCTIONS
Status: DISCONTINUED | OUTPATIENT
Start: 2025-02-13 | End: 2025-02-13 | Stop reason: HOSPADM

## 2025-02-13 RX ORDER — ACETAMINOPHEN 325 MG/1
975 TABLET ORAL ONCE
Status: DISCONTINUED | OUTPATIENT
Start: 2025-02-13 | End: 2025-02-13

## 2025-02-13 RX ORDER — METRONIDAZOLE 500 MG/100ML
500 INJECTION, SOLUTION INTRAVENOUS
Status: COMPLETED | OUTPATIENT
Start: 2025-02-13 | End: 2025-02-13

## 2025-02-13 RX ORDER — TRANEXAMIC ACID 10 MG/ML
1 INJECTION, SOLUTION INTRAVENOUS ONCE
Status: COMPLETED | OUTPATIENT
Start: 2025-02-13 | End: 2025-02-13

## 2025-02-13 RX ORDER — KETOROLAC TROMETHAMINE 30 MG/ML
INJECTION, SOLUTION INTRAMUSCULAR; INTRAVENOUS PRN
Status: DISCONTINUED | OUTPATIENT
Start: 2025-02-13 | End: 2025-02-13

## 2025-02-13 RX ORDER — HYDROMORPHONE HCL IN WATER/PF 6 MG/30 ML
0.2 PATIENT CONTROLLED ANALGESIA SYRINGE INTRAVENOUS EVERY 5 MIN PRN
Status: DISCONTINUED | OUTPATIENT
Start: 2025-02-13 | End: 2025-02-13 | Stop reason: HOSPADM

## 2025-02-13 RX ORDER — ONDANSETRON 4 MG/1
4 TABLET, ORALLY DISINTEGRATING ORAL EVERY 30 MIN PRN
Status: DISCONTINUED | OUTPATIENT
Start: 2025-02-13 | End: 2025-02-13 | Stop reason: HOSPADM

## 2025-02-13 RX ORDER — MEPERIDINE HYDROCHLORIDE 25 MG/ML
INJECTION INTRAMUSCULAR; INTRAVENOUS; SUBCUTANEOUS PRN
Status: DISCONTINUED | OUTPATIENT
Start: 2025-02-13 | End: 2025-02-13

## 2025-02-13 RX ORDER — DEXAMETHASONE SODIUM PHOSPHATE 4 MG/ML
INJECTION, SOLUTION INTRA-ARTICULAR; INTRALESIONAL; INTRAMUSCULAR; INTRAVENOUS; SOFT TISSUE PRN
Status: DISCONTINUED | OUTPATIENT
Start: 2025-02-13 | End: 2025-02-13

## 2025-02-13 RX ORDER — IBUPROFEN 400 MG/1
800 TABLET, FILM COATED ORAL ONCE
Status: DISCONTINUED | OUTPATIENT
Start: 2025-02-13 | End: 2025-02-13 | Stop reason: HOSPADM

## 2025-02-13 RX ORDER — SODIUM CHLORIDE, SODIUM LACTATE, POTASSIUM CHLORIDE, CALCIUM CHLORIDE 600; 310; 30; 20 MG/100ML; MG/100ML; MG/100ML; MG/100ML
INJECTION, SOLUTION INTRAVENOUS CONTINUOUS
Status: DISCONTINUED | OUTPATIENT
Start: 2025-02-13 | End: 2025-02-13 | Stop reason: HOSPADM

## 2025-02-13 RX ORDER — MAGNESIUM SULFATE HEPTAHYDRATE 40 MG/ML
4 INJECTION, SOLUTION INTRAVENOUS ONCE
Status: COMPLETED | OUTPATIENT
Start: 2025-02-13 | End: 2025-02-13

## 2025-02-13 RX ORDER — PROPOFOL 10 MG/ML
INJECTION, EMULSION INTRAVENOUS PRN
Status: DISCONTINUED | OUTPATIENT
Start: 2025-02-13 | End: 2025-02-13

## 2025-02-13 RX ORDER — AMOXICILLIN 250 MG
1-2 CAPSULE ORAL 2 TIMES DAILY PRN
Qty: 30 TABLET | Refills: 0 | Status: SHIPPED | OUTPATIENT
Start: 2025-02-13

## 2025-02-13 RX ORDER — ACETAMINOPHEN 325 MG/1
975 TABLET ORAL ONCE
Status: COMPLETED | OUTPATIENT
Start: 2025-02-13 | End: 2025-02-13

## 2025-02-13 RX ORDER — OXYCODONE HYDROCHLORIDE 5 MG/1
5-10 TABLET ORAL
Status: COMPLETED | OUTPATIENT
Start: 2025-02-13 | End: 2025-02-13

## 2025-02-13 RX ORDER — FENTANYL CITRATE 50 UG/ML
25 INJECTION, SOLUTION INTRAMUSCULAR; INTRAVENOUS EVERY 5 MIN PRN
Status: DISCONTINUED | OUTPATIENT
Start: 2025-02-13 | End: 2025-02-13 | Stop reason: HOSPADM

## 2025-02-13 RX ORDER — IBUPROFEN 800 MG/1
800 TABLET, FILM COATED ORAL EVERY 6 HOURS PRN
Qty: 30 TABLET | Refills: 0 | Status: SHIPPED | OUTPATIENT
Start: 2025-02-13

## 2025-02-13 RX ORDER — LIDOCAINE HYDROCHLORIDE 20 MG/ML
INJECTION, SOLUTION INFILTRATION; PERINEURAL PRN
Status: DISCONTINUED | OUTPATIENT
Start: 2025-02-13 | End: 2025-02-13

## 2025-02-13 RX ORDER — ONDANSETRON 2 MG/ML
INJECTION INTRAMUSCULAR; INTRAVENOUS PRN
Status: DISCONTINUED | OUTPATIENT
Start: 2025-02-13 | End: 2025-02-13

## 2025-02-13 RX ORDER — FENTANYL CITRATE 50 UG/ML
50 INJECTION, SOLUTION INTRAMUSCULAR; INTRAVENOUS EVERY 5 MIN PRN
Status: DISCONTINUED | OUTPATIENT
Start: 2025-02-13 | End: 2025-02-13 | Stop reason: HOSPADM

## 2025-02-13 RX ORDER — DEXAMETHASONE SODIUM PHOSPHATE 4 MG/ML
4 INJECTION, SOLUTION INTRA-ARTICULAR; INTRALESIONAL; INTRAMUSCULAR; INTRAVENOUS; SOFT TISSUE
Status: DISCONTINUED | OUTPATIENT
Start: 2025-02-13 | End: 2025-02-13 | Stop reason: HOSPADM

## 2025-02-13 RX ORDER — SCOPOLAMINE 1 MG/3D
1 PATCH, EXTENDED RELEASE TRANSDERMAL
Status: DISCONTINUED | OUTPATIENT
Start: 2025-02-13 | End: 2025-02-13 | Stop reason: HOSPADM

## 2025-02-13 RX ORDER — BUPIVACAINE HYDROCHLORIDE AND EPINEPHRINE 2.5; 5 MG/ML; UG/ML
INJECTION, SOLUTION INFILTRATION; PERINEURAL PRN
Status: DISCONTINUED | OUTPATIENT
Start: 2025-02-13 | End: 2025-02-13 | Stop reason: HOSPADM

## 2025-02-13 RX ORDER — PHENAZOPYRIDINE HYDROCHLORIDE 200 MG/1
200 TABLET, FILM COATED ORAL ONCE
Status: COMPLETED | OUTPATIENT
Start: 2025-02-13 | End: 2025-02-13

## 2025-02-13 RX ORDER — OXYCODONE HYDROCHLORIDE 5 MG/1
5-10 TABLET ORAL EVERY 4 HOURS PRN
Qty: 20 TABLET | Refills: 0 | Status: SHIPPED | OUTPATIENT
Start: 2025-02-13

## 2025-02-13 RX ORDER — ONDANSETRON 2 MG/ML
4 INJECTION INTRAMUSCULAR; INTRAVENOUS EVERY 30 MIN PRN
Status: DISCONTINUED | OUTPATIENT
Start: 2025-02-13 | End: 2025-02-13 | Stop reason: HOSPADM

## 2025-02-13 RX ORDER — NALOXONE HYDROCHLORIDE 0.4 MG/ML
0.1 INJECTION, SOLUTION INTRAMUSCULAR; INTRAVENOUS; SUBCUTANEOUS
Status: DISCONTINUED | OUTPATIENT
Start: 2025-02-13 | End: 2025-02-13 | Stop reason: HOSPADM

## 2025-02-13 RX ORDER — GABAPENTIN 300 MG/1
300 CAPSULE ORAL
Status: COMPLETED | OUTPATIENT
Start: 2025-02-13 | End: 2025-02-13

## 2025-02-13 RX ADMIN — PHENYLEPHRINE HYDROCHLORIDE 50 MCG: 10 INJECTION INTRAVENOUS at 08:54

## 2025-02-13 RX ADMIN — MAGNESIUM SULFATE HEPTAHYDRATE 4 G: 40 INJECTION, SOLUTION INTRAVENOUS at 07:49

## 2025-02-13 RX ADMIN — TRANEXAMIC ACID 1 G: 10 INJECTION, SOLUTION INTRAVENOUS at 07:38

## 2025-02-13 RX ADMIN — MEPERIDINE HYDROCHLORIDE 25 MG: 25 INJECTION, SOLUTION INTRAMUSCULAR; INTRAVENOUS; SUBCUTANEOUS at 08:28

## 2025-02-13 RX ADMIN — SCOLOPAMINE TRANSDERMAL SYSTEM 1 PATCH: 1 PATCH, EXTENDED RELEASE TRANSDERMAL at 07:08

## 2025-02-13 RX ADMIN — FENTANYL CITRATE 50 MCG: 50 INJECTION INTRAMUSCULAR; INTRAVENOUS at 07:34

## 2025-02-13 RX ADMIN — ONDANSETRON 4 MG: 2 INJECTION INTRAMUSCULAR; INTRAVENOUS at 08:50

## 2025-02-13 RX ADMIN — METRONIDAZOLE 500 MG: 500 INJECTION, SOLUTION INTRAVENOUS at 06:55

## 2025-02-13 RX ADMIN — PROPOFOL 20 MG: 10 INJECTION, EMULSION INTRAVENOUS at 09:15

## 2025-02-13 RX ADMIN — Medication 2 G: at 07:28

## 2025-02-13 RX ADMIN — DEXAMETHASONE SODIUM PHOSPHATE 4 MG: 4 INJECTION, SOLUTION INTRA-ARTICULAR; INTRALESIONAL; INTRAMUSCULAR; INTRAVENOUS; SOFT TISSUE at 07:38

## 2025-02-13 RX ADMIN — LIDOCAINE HYDROCHLORIDE 100 MG: 20 INJECTION, SOLUTION INFILTRATION; PERINEURAL at 07:34

## 2025-02-13 RX ADMIN — SODIUM CHLORIDE, POTASSIUM CHLORIDE, SODIUM LACTATE AND CALCIUM CHLORIDE: 600; 310; 30; 20 INJECTION, SOLUTION INTRAVENOUS at 07:29

## 2025-02-13 RX ADMIN — PHENAZOPYRIDINE 200 MG: 200 TABLET ORAL at 06:59

## 2025-02-13 RX ADMIN — MIDAZOLAM 2 MG: 1 INJECTION INTRAMUSCULAR; INTRAVENOUS at 07:28

## 2025-02-13 RX ADMIN — GABAPENTIN 300 MG: 300 CAPSULE ORAL at 07:00

## 2025-02-13 RX ADMIN — FENTANYL CITRATE 100 MCG: 50 INJECTION INTRAMUSCULAR; INTRAVENOUS at 08:34

## 2025-02-13 RX ADMIN — OXYCODONE HYDROCHLORIDE 5 MG: 5 TABLET ORAL at 10:34

## 2025-02-13 RX ADMIN — KETOROLAC TROMETHAMINE 30 MG: 30 INJECTION, SOLUTION INTRAMUSCULAR at 09:06

## 2025-02-13 RX ADMIN — ACETAMINOPHEN 975 MG: 325 TABLET, FILM COATED ORAL at 06:59

## 2025-02-13 RX ADMIN — HYDROMORPHONE HYDROCHLORIDE 0.2 MG: 0.2 INJECTION, SOLUTION INTRAMUSCULAR; INTRAVENOUS; SUBCUTANEOUS at 10:10

## 2025-02-13 RX ADMIN — PROPOFOL 200 MG: 10 INJECTION, EMULSION INTRAVENOUS at 07:34

## 2025-02-13 RX ADMIN — FENTANYL CITRATE 50 MCG: 50 INJECTION INTRAMUSCULAR; INTRAVENOUS at 08:02

## 2025-02-13 RX ADMIN — PROPOFOL 150 MCG/KG/MIN: 10 INJECTION, EMULSION INTRAVENOUS at 07:37

## 2025-02-13 RX ADMIN — Medication 200 MG: at 09:08

## 2025-02-13 ASSESSMENT — ACTIVITIES OF DAILY LIVING (ADL)
ADLS_ACUITY_SCORE: 32
ADLS_ACUITY_SCORE: 33

## 2025-02-13 NOTE — OP NOTE
[facility] Gynecologic Operative Note   Mirian Escalera  [id number]  February 13, 2025    Preoperative Diagnosis: Abnormal uterine bleeding, dysmenorrhea  Postoperative Diagnosis: same, endometriosis      Procedure:   Laparoscopic-assisted vaginal hysterectomy with bilateral salpingectomy, cystoscopy     Surgeon: Randee Ohara MD  Assists: Vandana Chand, PGY-4 OB/GYN Resident, Alberto Souza -his assistance was required for retraction during the vaginal portion of the procedure, as well as instrument suture handling during the laparoscopic portion    Anesthesia: GETA  Complications: none apparent      EBL: 100 mL   IVF: see anesthesia record   UOP: 100 mL clear urine     Findings: Exam under anesthesia revealed normal external gentalia, vagina and cervix. Bimanual exam with small, anteverted uterus with no appreciable adnexal enlargement. There was no evidence of injury with entry to the abdomen.  A survey of the upper abdomen showed normal liver, stomach, bowel, bladder, posterior culdesac, uterus, left fallopian tube and ovaries. Right fallopian tubes with paratubal cyst, studded with endometriosis. Hemostatic surgical site at the end of the case. Cystoscopy revealed no evidence of bladder injury or suture material. Bilateral ureteral urine jet stream noted.     Specimen: Uterus, cervix, bilateral fallopian tubes    Indications: Ms. Arreguin is a 36-bnex-dbkaold old female who presented to GYN clinic with complaint of abnormal uterine bleeding and dysmenorrhea. Treatment options were discussed and she desired surgical management with the above planned procedure.  The risks of bleeding, infection and intra-abdominal injury both recognized and unrecognized, conversion to laparotomy were all discussed and written informed consent was signed.  She is agreeable to a blood transfusion if indicated.    Procedure: The patient was taken to the operating room where general anesthesia was induced without  difficulty. She was then examined under anesthesia with the above noted findings. She was then prepared and draped in the normal sterile fashion in the dorsal lithotomy position using yellow fin stirrups. A arias catheter was placed in the bladder and a uterine manipulator was placed into the uterus.    Attention was then turned to the abdomen where a 5mm infra-umbilical skin incision was made. The veres needle was then introduced into the peritoneal cavity while tenting the abdominal wall. Intraperitoneal placement was confirmed by use of a water-filled syringe and drop in intra-abdominal pressure with insufflation of CO2 gas. The gas was turned on and pneumoperitoneum was achieved using CO2 gas. The trocar and sleeve were then advanced without difficulty into the abdomen where intra-abdominal placement was confirmed with the laparoscope. A second 5mm skin incision was then made in the RLQ and the trocar and sleeve advanced under direct visualization. A third skin incision, 5 mm, was made in the LLQ and the trocar and sleeve advanced under direct visualization.     A survey of the patient's abdomen and pelvis was made with the above noted findings. Attention was then turned to the pelvis where the right fallopian tube was serially cauterized and ligated to the cornua.  The utero-ovarian vessels and ligament as well as the right round ligament was serially cauterized and ligated. The anterior leave of the right broad ligament was cauterized then ligated. This was extended medially to create a bladder flap. These steps were repeated on the left side of the uterus. Attention was then turned to the vagina where 1%lidocaine with vasopressin was injected along the cervicovaginal junction. This plane was incised with cautery. The anterior peritoneum was entered with metzenbaum scissors and the posterior peritoneum was entered with diaz scissors. Retractors were placed. The right uterosacral ligament was grasped with a  Gisell clamp, incised and suture ligated. This was repeated on the left uterosacral ligament. The right uterine artery was similarly clamp, incised and suture ligated. This was repeated on the left side. The remaining parametrial tissue with clamped, cut and suture ligated until the cervix and uterus could be removed vaginally. The vaginal cuff was closed with [id number] vicryl sutures with care to incorporate the anterior and posterior peritoneum, as well as the uterosacral ligaments. A cystoscopy was performed that showed bilateral ureteral jets, confirming ureteral patency. Attention was then turned back to the abdomen were the pelvic was was irrigated, and excellent hemostasis was noted. Surgiflo was placed along the vaginal cuff. All ports and instruments were removed.     The skin incisions were then closed using 4-0 monocryl in a subcuticular fashion. Dermabond was applied.     The patient tolerated the procedure well. Sponge, lap and needle counts were correct. The patient was taken to the recovery area in stable condition.    Randee Ohara MD

## 2025-02-13 NOTE — OR NURSING
On bed pan but unable to void. No pain no nausea. Sips on water. Min vag. Bleeding. Pad in place.

## 2025-02-13 NOTE — OR NURSING
Oral airway out and pt awake . Denies pain and is doing well. Dozes off and on. Vss. Sips on water. So dry.

## 2025-02-13 NOTE — ANESTHESIA CARE TRANSFER NOTE
Patient: Mirian Escalera    Procedure: Procedure(s):  HYSTERECTOMY, VAGINAL, LAPAROSCOPY-ASSISTED, bilateral salpingectomy, removal of endometriosis, cystoscopy       Diagnosis: Endometriosis [N80.9]  Diagnosis Additional Information: No value filed.    Anesthesia Type:   General     Note:    Oropharynx: oral airway in place  Level of Consciousness: drowsy  Oxygen Supplementation: face mask  Level of Supplemental Oxygen (L/min / FiO2): 6  Independent Airway: airway patency satisfactory and stable  Dentition: dentition unchanged  Vital Signs Stable: post-procedure vital signs reviewed and stable  Report to RN Given: handoff report given  Patient transferred to: PACU    Handoff Report: Identifed the Patient, Identified the Reponsible Provider, Reviewed the pertinent medical history, Discussed the surgical course, Reviewed Intra-OP anesthesia mangement and issues during anesthesia, Set expectations for post-procedure period and Allowed opportunity for questions and acknowledgement of understanding      Vitals:  Vitals Value Taken Time   /72    Temp 36.1  C (96.98  F) 02/13/25 0935   Pulse 84 02/13/25 0935   Resp 18 02/13/25 0935   SpO2 98 % 02/13/25 0935   Vitals shown include unfiled device data.    Electronically Signed By: Paulette Menendez  February 13, 2025  9:36 AM

## 2025-02-13 NOTE — DISCHARGE INSTRUCTIONS
Same Day Surgery Discharge Instructions  Special Precautions After Surgery - Adult    It is not unusual to feel lightheaded or faint, up to 24 hours after surgery or while taking pain medication.  If you have these symptoms; sit for a few minutes before standing and have someone assist you when getting up.  You should rest and relax for the next 24 hours and must have someone stay with you for at least 24 hours after your discharge.  DO NOT DRIVE any vehicle or operate mechanical equipment for 24 hours following the end of your surgery.  DO NOT DRIVE while taking narcotic pain medications that have been prescribed by your physician.  If you had a limb operated on, you must be able to use it fully to drive.  DO NOT drink alcoholic beverages for 24 hours following surgery or while taking prescription pain medication.  Drink clear liquids (apple juice, ginger ale, broth, 7-Up, etc.).  Progress to your regular diet as you feel able.  Any questions call your physician and do not make important decisions for 24 hours.    Nausea and Vomiting: Nausea and vomiting can occur any time after receiving anesthesia. If you experience nausea and vomiting we encourage you to move to a clear liquid diet and advance your diet as tolerated. If nausea and vomiting do not improve within 12 hours please call the surgeon or present to the Emergency department.     Break-through Bleeding: If your experience bleeding from your surgical site apply pressure and additional dressing per nurse instruction. For simple problems such as a saturated dressing, you may need to reinforce the dressing with more gauze and tape and put slight pressure on the site. If bleeding does not subside contact the surgeon or present to the Emergency Department.    Post-op Infection: If you develop a fever of 100.4 or greater, have pus like drainage, redness, swelling or severe pain at the surgical site not alleviated with pain medications; please  contact the surgeon or present to the Emergency Department.     Medications:  Acetaminophen (Tylenol):  Next dose: 1:00 pm.  Ibuprofen (Motrin, Advil):  Next dose: 3:00 pm.  Oxycodone:  Next dose: 2:30 pm.  Senna-docusate:  Next dose: Start this evening.  Follow the instructions on the bottle.  __________________________________________________________________________________________________________________________________  IMPORTANT NUMBERS:    [facility] Main Number:  [phone], [phone]  Pharmacy:  [phone]  Same Day Surgery:  [phone], for general post-op questions call Monday - Thursday until 8:30 p.m., Fridays until 6:00 p.m.   Mental Health Mobile Crisis line: [phone]                                                                         OB Clinic:  [phone]

## 2025-02-13 NOTE — ANESTHESIA POSTPROCEDURE EVALUATION
Patient: Mirian Escalera    Procedure: Procedure(s):  HYSTERECTOMY, VAGINAL, LAPAROSCOPY-ASSISTED, bilateral salpingectomy, removal of endometriosis, cystoscopy       Anesthesia Type:  General    Note:  Disposition: Outpatient   Postop Pain Control: Uneventful            Sign Out: Well controlled pain   PONV: No   Neuro/Psych: Uneventful            Sign Out: Acceptable/Baseline neuro status   Airway/Respiratory: Uneventful            Sign Out: Acceptable/Baseline resp. status   CV/Hemodynamics: Uneventful            Sign Out: Acceptable CV status; No obvious hypovolemia; No obvious fluid overload   Other NRE: NONE   DID A NON-ROUTINE EVENT OCCUR? No           Last vitals:  Vitals Value Taken Time   /85 02/13/25 1020   Temp 35.5  C (95.9  F) 02/13/25 1021   Pulse 94 02/13/25 1020   Resp 15 02/13/25 1019   SpO2 98 % 02/13/25 1021   Vitals shown include unfiled device data.    Electronically Signed By: LYNDA Mercer CRNA  February 13, 2025  12:04 PM

## 2025-02-13 NOTE — OR NURSING
To pacu with oral airway and vss. Nsr. Iv infusing quickly per crna. No bleeding on pad. Scd on . Abd sites d/i

## 2025-02-13 NOTE — ANESTHESIA PROCEDURE NOTES
Airway       Patient location during procedure: OR       Procedure Start/Stop Times: 2/13/2025 7:37 AM  Staff -        CRNA: Ashlie Hahn APRN CRNA       Other Anesthesia Staff: Paulette Menendez       Performed By: NOEMI and CRNA  Consent for Airway        Urgency: elective  Indications and Patient Condition       Indications for airway management: glenny-procedural       Induction type:intravenous       Mask difficulty assessment: 1 - vent by mask    Final Airway Details       Final airway type: endotracheal airway       Successful airway: ETT - single  Endotracheal Airway Details        ETT size (mm): 7.0       Cuffed: yes       Successful intubation technique: video laryngoscopy       VL Blade Size: Maria 3       Grade View of Cords: 1       Adjucts: stylet       Position: Right       Measured from: lips       Secured at (cm): 22       Bite block used: None    Post intubation assessment        Placement verified by: capnometry, equal breath sounds and chest rise        Number of attempts at approach: 1       Number of other approaches attempted: 0       Secured with: tape       Ease of procedure: easy       Dentition: Intact and Unchanged    Medication(s) Administered   Medication Administration Time: 2/13/2025 7:37 AM

## 2025-02-13 NOTE — ANESTHESIA POSTPROCEDURE EVALUATION
Patient: Mirian Escalera    Procedure: Procedure(s):  HYSTERECTOMY, VAGINAL, LAPAROSCOPY-ASSISTED, bilateral salpingectomy, removal of endometriosis, cystoscopy       Anesthesia Type:  General    Note:  Disposition: Outpatient   Postop Pain Control: Uneventful            Sign Out: Well controlled pain   PONV: No   Neuro/Psych: Uneventful            Sign Out: Acceptable/Baseline neuro status   Airway/Respiratory: Uneventful            Sign Out: Acceptable/Baseline resp. status   CV/Hemodynamics: Uneventful            Sign Out: Acceptable CV status; No obvious hypovolemia; No obvious fluid overload   Other NRE: NONE   DID A NON-ROUTINE EVENT OCCUR? No           Last vitals:  Vitals Value Taken Time   /85 02/13/25 1020   Temp 35.5  C (95.9  F) 02/13/25 1021   Pulse 94 02/13/25 1020   Resp 15 02/13/25 1019   SpO2 98 % 02/13/25 1021   Vitals shown include unfiled device data.    Electronically Signed By: Paulette Menendez  February 13, 2025  11:36 AM

## 2025-02-13 NOTE — OR NURSING
[facility] DISCHARGE NOTE    Patient discharged to home at 11:18 AM via wheel chair. Accompanied by spouse and staff. Discharge instructions reviewed with patient and spouse, opportunity offered to ask questions. Prescriptions filled and sent with patient upon discharge. All belongings sent with patient.    Maame Song RN

## 2025-02-13 NOTE — OR NURSING
Dilaudid iv for pain. Pain minimal and ready to see . Report to Maame rowley. No other concerns. Min. Bleeding. Abd soft. Will cont to reassess.

## 2025-03-05 ENCOUNTER — TELEPHONE (OUTPATIENT)
Dept: OBGYN | Facility: CLINIC | Age: 45
End: 2025-03-05
Payer: COMMERCIAL

## 2025-03-05 NOTE — TELEPHONE ENCOUNTER
S-(situation):vaginal bleeding since Saturday     B-(background): 2/13/25    HYSTERECTOMY, VAGINAL, LAPAROSCOPY-ASSISTED, bilateral salpingectomy, removal of endometriosis, cystoscopy      A-(assessment): Patient reports bright red vaginal bleeding with clots smaller than peas that started on Saturday. Patient reports bleeding is not getting any better but is also not worsening  Patient using overnight pads and changing 4-5 per day. They are not saturated but patient changes when she uses the bathroom. Patient denies any fishy or foul odor. Patient denies any vaginal symptoms like burning or itching.     Patient overall denies any pain or new pain. Patient is not taking any pain medications. Patient reports occasionally she feels mild cramping.    Patient has had some nausea but does not feel ill. Patient denies vomiting. Patient reports decreased appetite but appetite has been this way since surgery.    Patient denies any strenuous activity or exercise. Patient denies anything in the vagina.     Patient denies being dizzy or lightheaded. Patient denies being faint or unable to do daily activities.    R-(recommendations): Reassurance provided. Continue to monitor. Appointment recommended for further evaluation. Will send to provider on call to review and advise as appointment not available this week.    Reviewed red flags and when to be seen more emergently.    Pt in agreement and reports understanding.      Beryl HARMON RN   [facility]

## 2025-03-05 NOTE — TELEPHONE ENCOUNTER
[facility] Call Center    Phone Message    May a detailed message be left on voicemail: yes     Reason for Call: Symptoms or Concerns     If patient has red-flag symptoms, warm transfer to triage line    Current symptom or concern: Light bleeding started on 3/1/2025    Symptoms have been present for:  4 day(s)    Has patient previously been seen for this? No    Patient had hysterectomy on 2/13/2025 and is now experiencing light bleeding since Saturday. Sending TE per clinic. Please contact patient- [phone]    Action Taken: Message routed to:  Other: [facility]    Travel Screening: Not Applicable     Date of Service:

## 2025-03-06 ENCOUNTER — OFFICE VISIT (OUTPATIENT)
Dept: OBGYN | Facility: CLINIC | Age: 45
End: 2025-03-06
Payer: COMMERCIAL

## 2025-03-06 VITALS
WEIGHT: 207 LBS | DIASTOLIC BLOOD PRESSURE: 82 MMHG | BODY MASS INDEX: 38.09 KG/M2 | TEMPERATURE: 98.2 F | SYSTOLIC BLOOD PRESSURE: 122 MMHG | RESPIRATION RATE: 18 BRPM | HEART RATE: 64 BPM | HEIGHT: 62 IN

## 2025-03-06 DIAGNOSIS — N93.9 VAGINAL BLEEDING: Primary | ICD-10-CM

## 2025-03-06 NOTE — TELEPHONE ENCOUNTER
Spoke with patient this morning.  Bleeding and spotting seems to be lighter today. Patient still prefers to be seen for appointment. Patient scheduled with Dr. Pacheco in Dr. Ohara's absence.    Beryl HARMON RN   [facility]

## 2025-03-06 NOTE — NURSING NOTE
"Initial /82 (BP Location: Left arm, Patient Position: Chair, Cuff Size: Adult Large)   Pulse 64   Temp 98.2  F (36.8  C) (Tympanic)   Resp 18   Ht 1.575 m (5' 2\")   Wt 93.9 kg (207 lb)   LMP  (LMP Unknown)   BMI 37.86 kg/m   Estimated body mass index is 37.86 kg/m  as calculated from the following:    Height as of this encounter: 1.575 m (5' 2\").    Weight as of this encounter: 93.9 kg (207 lb). .    "

## 2025-03-06 NOTE — PROGRESS NOTES
[facility]  OB/GYN     CC: Post-op     SUBJECTIVE:    Mirian is a 44 year old female who is status post [facility] on 2/13 who presents with vaginal bleeding.  Patient states she noticed a small amount of vaginal bleeding earlier this week and has persisted.  Not feeling a pad.  No dizziness or lightheadedness.  No nausea or vomiting.  Pain is well-controlled.  No new urinary or bowel symptoms.  Otherwise feeling well.  No trauma.  No sexual intercourse.  No other concerns today.     Past Surgical History:   Procedure Laterality Date    HYSTERECTOMY, VAGINAL, LAPAROSCOPY-ASSISTED, WITH SALPINGECTOMY, CYSTOSCOPY Bilateral 2/13/2025    Procedure: HYSTERECTOMY, VAGINAL, LAPAROSCOPY-ASSISTED, bilateral salpingectomy, removal of endometriosis, cystoscopy;  Surgeon: Randee Ohara MD;  Location: [facility] OR    OTHER SURGICAL HISTORY      none       Current Outpatient Medications   Medication Sig Dispense Refill    LEVOTHYROXINE SODIUM PO Take by mouth daily      losartan (COZAAR) 50 MG tablet Take 50 mg by mouth daily.      SUMAtriptan (IMITREX) 50 MG tablet Take 50 mg by mouth at onset of headache.      ibuprofen (ADVIL/MOTRIN) 800 MG tablet Take 1 tablet (800 mg) by mouth every 6 hours as needed for other (mild and/or inflammatory pain). (Patient not taking: Reported on 3/6/2025) 30 tablet 0    meloxicam (MOBIC) 15 MG tablet Take 1 tablet (15 mg) by mouth daily 30 tablet 0    oxyCODONE (ROXICODONE) 5 MG tablet Take 1-2 tablets (5-10 mg) by mouth every 4 hours as needed for moderate to severe pain. (Patient not taking: Reported on 3/6/2025) 20 tablet 0    senna-docusate (SENOKOT-S/PERICOLACE) 8.6-50 MG tablet Take 1-2 tablets by mouth 2 times daily as needed for constipation (While on oral opioids.). (Patient not taking: Reported on 3/6/2025) 30 tablet 0    SETLAKIN 0.15-0.03 MG tablet Take 1 tablet by mouth daily. (Patient not taking: Reported on 3/6/2025)         OBJECTIVE:  /82 (BP Location:  "Left arm, Patient Position: Chair, Cuff Size: Adult Large)   Pulse 64   Temp 98.2  F (36.8  C) (Tympanic)   Resp 18   Ht 1.575 m (5' 2\")   Wt 93.9 kg (207 lb)   LMP  (LMP Unknown)   BMI 37.86 kg/m    Estimated body mass index is 37.86 kg/m  as calculated from the following:    Height as of this encounter: 1.575 m (5' 2\").    Weight as of this encounter: 93.9 kg (207 lb).    PHYSICAL EXAM:    GENERAL: Pleasant, talkative and in no apparent distress   HEENT:  Normocephalic, non-tender.     LUNGS:  No increased work of breathing   HEART:  Regular rate, well perfused   ABDOMEN:  Bowel sounds are present throughout.  The abdomen is soft, appropriately tender.   INCISION: C/D/I     normal-appearing external genitalia, vaginal mucosa, vaginal cuff appears and palpates without obvious defects, scant amount of brown discharge in vaginal vault   NEURO:  No focal deficits   MSK:  Lower extremities without edema or tenderness.      LABS:     ASSESSMENT/PLAN: 44 year old female who status post LAVH on 2/14 with concern for vaginal bleeding.  Overall she appears to be doing quite well.  On examination no concerning findings on vaginal exam with no defects noted on visual or bimanual exam no other concerning symptoms.  Otherwise she seems to be doing quite well.  Precautions reviewed.  Recommended to follow-up as scheduled for routine postop appointment.  Of the vaginal cuff.      Anna Pacheco MD  [facility]     "

## 2025-03-17 ENCOUNTER — OFFICE VISIT (OUTPATIENT)
Dept: OBGYN | Facility: CLINIC | Age: 45
End: 2025-03-17
Payer: COMMERCIAL

## 2025-03-17 VITALS
WEIGHT: 206.4 LBS | DIASTOLIC BLOOD PRESSURE: 80 MMHG | HEIGHT: 62 IN | TEMPERATURE: 99.4 F | SYSTOLIC BLOOD PRESSURE: 122 MMHG | RESPIRATION RATE: 16 BRPM | BODY MASS INDEX: 37.98 KG/M2 | HEART RATE: 82 BPM

## 2025-03-17 DIAGNOSIS — Z09 POSTOP CHECK: Primary | ICD-10-CM

## 2025-03-17 PROCEDURE — 99024 POSTOP FOLLOW-UP VISIT: CPT | Performed by: OBSTETRICS & GYNECOLOGY

## 2025-03-17 PROCEDURE — 3074F SYST BP LT 130 MM HG: CPT | Performed by: OBSTETRICS & GYNECOLOGY

## 2025-03-17 PROCEDURE — 3079F DIAST BP 80-89 MM HG: CPT | Performed by: OBSTETRICS & GYNECOLOGY

## 2025-03-17 NOTE — PROGRESS NOTES
"[facility] OB/GYN    CC: Postop Visit    S: Pt doing well. She denies any complaints. Off any pain medications. Eating, drinking, urinating and moving bowels without any issues. Vaginal bleeding resolved.     O:   VS: /80 (BP Location: Right arm, Patient Position: Chair, Cuff Size: Adult Large)   Pulse 82   Temp 99.4  F (37.4  C) (Tympanic)   Resp 16   Ht 1.575 m (5' 2\")   Wt 93.6 kg (206 lb 6.4 oz)   LMP  (LMP Unknown)   BMI 37.75 kg/m    General: NAD  CV: Regular rate, warm and well perfused  Resp: breathing comfortably on room air   Abdomen: soft, non-tender, nondistended, well-healed incision   Well healing vaginal cuff intact both digitally and visually  Extremities: non-tender, non-edematous     A/P: 45yo 4wks post-op s/p TVH, BS  Appropriate postop recovery.   Reviewed pathology - benign.   Reviewed pelvic rest restrictions.   PAP/HPV discontinued from Health Maintenance.     RTC for annual exam or PRN.    Randee Ohara MD, MD  [facility]     "

## 2025-03-17 NOTE — PROGRESS NOTES
"Initial /80 (BP Location: Right arm, Patient Position: Chair, Cuff Size: Adult Large)   Pulse 82   Temp 99.4  F (37.4  C) (Tympanic)   Resp 16   Ht 1.575 m (5' 2\")   Wt 93.6 kg (206 lb 6.4 oz)   LMP  (LMP Unknown)   BMI 37.75 kg/m   Estimated body mass index is 37.75 kg/m  as calculated from the following:    Height as of this encounter: 1.575 m (5' 2\").    Weight as of this encounter: 93.6 kg (206 lb 6.4 oz). .  "

## 2025-03-19 ENCOUNTER — OFFICE VISIT (OUTPATIENT)
Dept: OBGYN | Facility: CLINIC | Age: 45
End: 2025-03-19
Payer: COMMERCIAL

## 2025-03-19 ENCOUNTER — TELEPHONE (OUTPATIENT)
Dept: OBGYN | Facility: CLINIC | Age: 45
End: 2025-03-19

## 2025-03-19 VITALS
WEIGHT: 205 LBS | TEMPERATURE: 99.1 F | HEIGHT: 62 IN | DIASTOLIC BLOOD PRESSURE: 84 MMHG | HEART RATE: 88 BPM | RESPIRATION RATE: 20 BRPM | SYSTOLIC BLOOD PRESSURE: 134 MMHG | BODY MASS INDEX: 37.73 KG/M2

## 2025-03-19 DIAGNOSIS — R39.9 UTI SYMPTOMS: Primary | ICD-10-CM

## 2025-03-19 DIAGNOSIS — N30.01 ACUTE CYSTITIS WITH HEMATURIA: ICD-10-CM

## 2025-03-19 LAB
ALBUMIN UR-MCNC: 100 MG/DL
APPEARANCE UR: ABNORMAL
BACTERIA #/AREA URNS HPF: ABNORMAL /HPF
BILIRUB UR QL STRIP: NEGATIVE
COLOR UR AUTO: YELLOW
GLUCOSE UR STRIP-MCNC: NEGATIVE MG/DL
HGB UR QL STRIP: ABNORMAL
KETONES UR STRIP-MCNC: NEGATIVE MG/DL
LEUKOCYTE ESTERASE UR QL STRIP: ABNORMAL
MUCOUS THREADS #/AREA URNS LPF: PRESENT /LPF
NITRATE UR QL: POSITIVE
PH UR STRIP: 6 [PH] (ref 5–7)
RBC #/AREA URNS AUTO: >100 /HPF
SP GR UR STRIP: 1.02 (ref 1–1.03)
SQUAMOUS #/AREA URNS AUTO: ABNORMAL /LPF
UROBILINOGEN UR STRIP-ACNC: 0.2 E.U./DL
WBC #/AREA URNS AUTO: ABNORMAL /HPF

## 2025-03-19 PROCEDURE — 81001 URINALYSIS AUTO W/SCOPE: CPT | Performed by: PHYSICIAN ASSISTANT

## 2025-03-19 RX ORDER — CEFDINIR 300 MG/1
300 CAPSULE ORAL 2 TIMES DAILY
Qty: 14 CAPSULE | Refills: 0 | Status: SHIPPED | OUTPATIENT
Start: 2025-03-19 | End: 2025-03-26

## 2025-03-19 NOTE — NURSING NOTE
"Initial /84 (BP Location: Right arm, Patient Position: Chair, Cuff Size: Adult Large)   Pulse 88   Temp 99.1  F (37.3  C) (Tympanic)   Resp 20   Ht 1.575 m (5' 2\")   Wt 93 kg (205 lb)   LMP  (LMP Unknown)   BMI 37.49 kg/m   Estimated body mass index is 37.49 kg/m  as calculated from the following:    Height as of this encounter: 1.575 m (5' 2\").    Weight as of this encounter: 93 kg (205 lb). .    "

## 2025-03-19 NOTE — TELEPHONE ENCOUNTER
Appointment recommended.  Patient scheduled with Dalila Marin PA-C.  Check in 0830 appointment 0845.    Pt in agreement and reports understanding.    Beryl HARMON RN   [facility]

## 2025-03-19 NOTE — RESULT ENCOUNTER NOTE
Patient informed of urine results in office today. Patient treated for UTI. Urine culture still in process.     Dalila Marin PA-C

## 2025-03-19 NOTE — PROGRESS NOTES
"Mirian Escalera is a 44 year old female who  presents today with urinary symptoms. Symptoms of dysuria, urgency, frequency, burning, and suprapubic pain and pressure have been going on for 1day(s).  Hematuria maybe slight.  sudden onset and mild.  This patient does have a history of urinary tract infections, but not for almost 6 years.  Patient s/p LAVH on 2/13.   Vaginal symptoms none   No fevers, chills, back pain, nausea/vomiting.     Problem list, Medication list, Allergies, and Medical/Social/Surgical histories reviewed in [facility] and updated as appropriate.    ROS:  Constitutional, HEENT, cardiovascular, pulmonary, GI and  systems are negative, except as otherwise noted.    OBJECTIVE:                                                    /84 (BP Location: Right arm, Patient Position: Chair, Cuff Size: Adult Large)   Pulse 88   Temp 99.1  F (37.3  C) (Tympanic)   Resp 20   Ht 1.575 m (5' 2\")   Wt 93 kg (205 lb)   LMP  (LMP Unknown)   BMI 37.49 kg/m      GENERAL APPEARANCE: healthy, alert and no distress  RESP: lungs clear to auscultation - no rales, rhonchi or wheezes  CV: regular rates and rhythm, normal S1 S2, no murmur noted  ABDOMEN:  soft, nontender, no HSM or masses and bowel sounds normal  BACK: No CVA tenderness  SKIN: no suspicious lesions or rashes    Results for orders placed or performed in visit on 03/19/25 (from the past 24 hours)   UA with Microscopic   Result Value Ref Range    Color Urine Yellow Colorless, Straw, Light Yellow, Yellow    Appearance Urine Cloudy (A) Clear    Glucose Urine Negative Negative mg/dL    Bilirubin Urine Negative Negative    Ketones Urine Negative Negative mg/dL    Specific Gravity Urine 1.025 1.003 - 1.035    Blood Urine Large (A) Negative    pH Urine 6.0 5.0 - 7.0    Protein Albumin Urine 100 (A) Negative mg/dL    Urobilinogen Urine 0.2 0.2, 1.0 E.U./dL    Nitrite Urine Positive (A) Negative    Leukocyte Esterase Urine Moderate (A) Negative   Urine " Microscopic Exam   Result Value Ref Range    Bacteria Urine Moderate (A) None Seen /HPF    RBC Urine >100 (A) 0-2 /HPF /HPF    WBC Urine 25-50 (A) 0-5 /HPF /HPF    Squamous Epithelials Urine Few (A) None Seen /LPF    Mucus Urine Present (A) None Seen /LPF       ASSESSMENT/PLAN:                                                        ICD-10-CM    1. UTI symptoms  R39.9 UA with Microscopic     Urine Culture     Urine Microscopic Exam     cefdinir (OMNICEF) 300 MG capsule      2. Acute cystitis with hematuria  N30.01           Patient with typical UTI symptoms today and positive UA. UCx sent and pending. Will treat with 7 day course of cefdinir. No signs/symptoms of pyelonephritis or ureterolithiasis at this time. Patient to return if symptoms worsen/change.  Patient voiced understanding of instructions given.      There are no Patient Instructions on file for this visit.    Dalila Marin PA-C  [facility] [place]

## 2025-03-19 NOTE — TELEPHONE ENCOUNTER
[facility] Call Center    Phone Message    May a detailed message be left on voicemail: yes     Reason for Call: Symptoms or Concerns     If patient has red-flag symptoms, warm transfer to triage line    Current symptom or concern: constant urge to urinate, buring while urinating, cloudy and stinky urine    Symptoms have been present for: 15ish hour(s)    Has patient previously been seen for this? No      Action Taken: Message routed to:  Other: [facility]    Travel Screening: Not Applicable

## 2025-03-20 LAB
BACTERIA UR CULT: ABNORMAL
BACTERIA UR CULT: ABNORMAL

## 2025-03-22 LAB
BACTERIA UR CULT: ABNORMAL
BACTERIA UR CULT: ABNORMAL

## 2025-03-24 ENCOUNTER — OFFICE VISIT (OUTPATIENT)
Dept: OBGYN | Facility: CLINIC | Age: 45
End: 2025-03-24
Payer: COMMERCIAL

## 2025-03-24 VITALS
SYSTOLIC BLOOD PRESSURE: 136 MMHG | DIASTOLIC BLOOD PRESSURE: 88 MMHG | OXYGEN SATURATION: 99 % | HEIGHT: 62 IN | HEART RATE: 100 BPM | BODY MASS INDEX: 37.87 KG/M2 | WEIGHT: 205.8 LBS | TEMPERATURE: 98.8 F

## 2025-03-24 DIAGNOSIS — R35.0 URINARY FREQUENCY: ICD-10-CM

## 2025-03-24 DIAGNOSIS — N30.01 ACUTE CYSTITIS WITH HEMATURIA: Primary | ICD-10-CM

## 2025-03-24 PROCEDURE — 99213 OFFICE O/P EST LOW 20 MIN: CPT | Mod: 24 | Performed by: OBSTETRICS & GYNECOLOGY

## 2025-03-24 PROCEDURE — 3075F SYST BP GE 130 - 139MM HG: CPT | Performed by: OBSTETRICS & GYNECOLOGY

## 2025-03-24 PROCEDURE — 3079F DIAST BP 80-89 MM HG: CPT | Performed by: OBSTETRICS & GYNECOLOGY

## 2025-03-24 RX ORDER — NITROFURANTOIN 25; 75 MG/1; MG/1
100 CAPSULE ORAL 2 TIMES DAILY
Qty: 14 CAPSULE | Refills: 0 | Status: SHIPPED | OUTPATIENT
Start: 2025-03-24 | End: 2025-03-31

## 2025-03-24 RX ORDER — FAMOTIDINE 20 MG/1
1 TABLET, FILM COATED ORAL DAILY
COMMUNITY
Start: 2025-03-13

## 2025-03-24 NOTE — PROGRESS NOTES
Chief Complaint   Patient presents with    Follow Up     UTI         HPI:  Mirian Escalera, 44 year old,  presents with complaints of a UTI.  She was given antibiotics and has two days left.  She initially felt a little better, but it is getting worse.  She came in Wednesday with frequency, urgency, burning with urination and hematuria.  The burning is a little better, but she still has all the same symptoms.  She is leaving for vacation in 2 days and is worried she will still have symptoms then.      History reviewed. No pertinent past medical history.  Past Surgical History:   Procedure Laterality Date    HYSTERECTOMY, VAGINAL, LAPAROSCOPY-ASSISTED, WITH SALPINGECTOMY, CYSTOSCOPY Bilateral 2025    Procedure: HYSTERECTOMY, VAGINAL, LAPAROSCOPY-ASSISTED, bilateral salpingectomy, removal of endometriosis, cystoscopy;  Surgeon: Randee Ohara MD;  Location: [facility] OR    OTHER SURGICAL HISTORY      none     Social History     Socioeconomic History    Marital status:      Spouse name: Not on file    Number of children: Not on file    Years of education: Not on file    Highest education level: Not on file   Occupational History    Not on file   Tobacco Use    Smoking status: Never     Passive exposure: Past    Smokeless tobacco: Not on file   Vaping Use    Vaping status: Never Used   Substance and Sexual Activity    Alcohol use: Yes     Comment: occasional    Drug use: No    Sexual activity: Not on file   Other Topics Concern    Not on file   Social History Narrative    Not on file     Social Drivers of Health     Financial Resource Strain: Not on File (2024)    Received from [employer]    Financial Resource Strain     Financial Resource Strain: 0   Food Insecurity: Not on File (2024)    Received from [employer]    Food Insecurity     Food: 0   Transportation Needs: Not on File (2024)    Received from [employer]    Transportation Needs     Transportation: 0   Physical Activity: Not on  File (2024)    Received from [employer]    Physical Activity     Physical Activity: 0   Stress: Not on File (2024)    Received from [employer]    Stress     Stress: 0   Social Connections: Not on File (2024)    Received from [employer]    Social Connections     Connectedness: 0   Interpersonal Safety: Low Risk  (2025)    Interpersonal Safety     Do you feel physically and emotionally safe where you currently live?: Yes     Within the past 12 months, have you been hit, slapped, kicked or otherwise physically hurt by someone?: No     Within the past 12 months, have you been humiliated or emotionally abused in other ways by your partner or ex-partner?: No   Housing Stability: Not on File (2024)    Received from [employer]    Housing Stability     Housin     Family History   Problem Relation Age of Onset    Hypertension Mother     Cerebrovascular Disease Mother     Thyroid Disease Mother     Factor V Leiden deficiency Mother     Endometriosis Mother     Hypertension Father     Heart Disease Maternal Grandfather     Factor V Leiden deficiency Maternal Grandfather     Endometriosis Sister         Current Outpatient Medications   Medication Sig Dispense Refill    famotidine (PEPCID) 20 MG tablet Take 1 tablet by mouth daily.      LEVOTHYROXINE SODIUM PO Take by mouth daily      losartan (COZAAR) 50 MG tablet Take 50 mg by mouth daily.      nitroFURantoin macrocrystal-monohydrate (MACROBID) 100 MG capsule Take 1 capsule (100 mg) by mouth 2 times daily for 7 days. 14 capsule 0    cefdinir (OMNICEF) 300 MG capsule Take 1 capsule (300 mg) by mouth 2 times daily for 7 days. (Patient not taking: Reported on 3/24/2025) 14 capsule 0    SUMAtriptan (IMITREX) 50 MG tablet Take 50 mg by mouth at onset of headache. (Patient not taking: Reported on 3/24/2025)          Allergies:     Allergies   Allergen Reactions    Levofloxacin Unknown        ROS:  See HPI      Physical Exam:  /88 (BP Location: Right arm, Patient  "Position: Chair, Cuff Size: Adult Large)   Pulse 100   Temp 98.8  F (37.1  C) (Tympanic)   Ht 1.575 m (5' 2\")   Wt 93.4 kg (205 lb 12.8 oz)   LMP  (LMP Unknown)   SpO2 99%   BMI 37.64 kg/m       Appearance: well developed, well nourished, no acute distress  Head Exam normocephalic, no lesions or deformities  Psych: orientated x3    Assessment/Plan:  Encounter Diagnoses   Name Primary?    Acute cystitis with hematuria Yes    Urinary frequency        I reviewed her urine culture from last visit.  I recommended switching to Macrobid.  She will finish her last dose of omnicef and start Macrobid for 7 days. If not improved, will repeat UA.  I did put a standing order for a UA in for her.          Bel Pan MD on 3/24/2025 at 10:37 AM              "

## 2025-03-24 NOTE — NURSING NOTE
"Initial /88 (BP Location: Right arm, Patient Position: Chair, Cuff Size: Adult Large)   Pulse 100   Temp 98.8  F (37.1  C) (Tympanic)   Ht 1.575 m (5' 2\")   Wt 93.4 kg (205 lb 12.8 oz)   LMP  (LMP Unknown)   SpO2 99%   BMI 37.64 kg/m   Estimated body mass index is 37.64 kg/m  as calculated from the following:    Height as of this encounter: 1.575 m (5' 2\").    Weight as of this encounter: 93.4 kg (205 lb 12.8 oz). .    Celine Woods, YAZAN    "

## 2025-04-16 ENCOUNTER — PATIENT OUTREACH (OUTPATIENT)
Dept: OBGYN | Facility: CLINIC | Age: 45
End: 2025-04-16
Payer: COMMERCIAL

## 2025-04-16 NOTE — TELEPHONE ENCOUNTER
"Panel Management Review      Health Maintenance List    Health Maintenance   Topic Date Due    TSH W/FREE T4 REFLEX  Never done    ADVANCE CARE PLANNING  Never done    COLORECTAL CANCER SCREENING  Never done    LIPID  08/17/2016    YEARLY PREVENTIVE VISIT  01/13/2026    BMP  02/13/2026    MAMMO SCREENING  03/29/2026    DTAP/TDAP/TD IMMUNIZATION (8 - Td or Tdap) 10/06/2026    DIABETES SCREENING  02/13/2028    ZOSTER IMMUNIZATION (1 of 2) 11/12/2029    HEPATITIS C SCREENING  Completed    HIV SCREENING  Completed    PHQ-2 (once per calendar year)  Completed    INFLUENZA VACCINE  Completed    HEPATITIS B IMMUNIZATION  Completed    COVID-19 Vaccine  Completed    Pneumococcal Vaccine: Pediatrics (0 to 5 Years) and At-Risk Patients (6 to 49 Years)  Aged Out    HPV IMMUNIZATION  Aged Out    MENINGITIS IMMUNIZATION  Aged Out    HPV TEST  Discontinued    PAP  Discontinued       Composite cancer screening  Chart review shows that this patient is due/due soon for the following Colonoscopy  No results found for: \"PAP\"  Past Surgical History:   Procedure Laterality Date    HYSTERECTOMY, VAGINAL, LAPAROSCOPY-ASSISTED, WITH SALPINGECTOMY, CYSTOSCOPY Bilateral 2/13/2025    Procedure: HYSTERECTOMY, VAGINAL, LAPAROSCOPY-ASSISTED, bilateral salpingectomy, removal of endometriosis, cystoscopy;  Surgeon: Randee Ohara MD;  Location: [facility] OR    OTHER SURGICAL HISTORY      none       Is hysterectomy listed in surgical history? Yes   Is mastectomy listed in surgical history? No     Summary:    Patient is due/failing the following:   Colonoscopy    Action needed: Patient needs office visit for colonoscopy.    Type of outreach:  Sent [facility] message.      Staff Signature:  Celine Woods CMA      "

## 2025-04-16 NOTE — LETTER
2025      Mirian Escalera  [id number] [facility] [address]              Dear Mirian,    To ensure we are providing the best quality care, we have reviewed your chart and see that you are due for:    Colon Cancer Screening:    If you have received a referral to schedule a Colonoscopy or choose to do a Colonoscopy instead of the FIT/ Cologuard test, please call [phone] to schedule.    If you have received a FIT test kit from a prior office visit, please check the expiration date. If , please get a new kit from the Lab/ Clinic. If not , please complete the test and mail in as soon as you are able.    If you would prefer to complete a Cologuard test, please reach out to your provider to see if this is an option for you.    You may call Dept: [phone] if you have any questions. If you have completed the tests outside of [facility], please have the results forwarded to our office Fax # [phone]. We will update the chart for your primary Physician to review before your next annual physical.        Sincerely,      Randee Ohara MD

## (undated) DEVICE — TUBING SUCTION 12"X1/4" N612

## (undated) DEVICE — GLOVE BIOGEL PI MICRO SZ 6.5 48565

## (undated) DEVICE — GLOVE BIOGEL PI MICRO INDICATOR UNDERGLOVE SZ 7.0 48970

## (undated) DEVICE — DRAPE POUCH INSTRUMENT 3 POCKET 1018L

## (undated) DEVICE — SYR 10ML LL W/O NDL

## (undated) DEVICE — SU DERMABOND ADVANCED .7ML DNX12

## (undated) DEVICE — SOL WATER IRRIG 1000ML BOTTLE 07139-09

## (undated) DEVICE — ESU HOLSTER PLASTIC DISP E2400

## (undated) DEVICE — SUCTION IRR STRYKERFLOW II W/TIP 250-070-520

## (undated) DEVICE — ENDO TROCAR FIRST ENTRY KII FIOS ADV FIX 05X100MM CFF03

## (undated) DEVICE — SU VICRYL 0 CT-1 36" J946H

## (undated) DEVICE — ENDO APPLICATOR SURGIFLO PLASMA COBLATION MS1995

## (undated) DEVICE — STOCKING SLEEVE COMPRESSION CALF MED

## (undated) DEVICE — SU VICRYL 0 CT-2 CR 8X18" J727D

## (undated) DEVICE — PREP CHLORHEXIDINE 4% 4OZ (HIBICLENS) 57504

## (undated) DEVICE — UTERINE MANIPULATOR HUMI KRONER 6003

## (undated) DEVICE — SYR 10ML FINGER CONTROL W/O NDL 309695

## (undated) DEVICE — ENDO TROCAR SLEEVE KII ADV FIXATION 05X100MM CFS02

## (undated) DEVICE — SOL NACL 0.9% IRRIG 3000ML BAG 07972-08

## (undated) DEVICE — ANTIFOG SOLUTION SEE SHARP 150M TROCAR SWABS 30978 (COI)

## (undated) DEVICE — TUBING INSUFFLATION W/FILTER CPC TO LUER 620-030-301

## (undated) DEVICE — PACK LAPAROSCOPY/PELVISCOPY STD

## (undated) DEVICE — BLADE KNIFE SURG 11 371111

## (undated) DEVICE — DECANTER VIAL 2006S

## (undated) DEVICE — SU VICRYL 4-0 FS-2 27" J422-H

## (undated) DEVICE — PACK LAPAROTOMY CUSTOM LAKES

## (undated) DEVICE — NDL INSUFFLATION 13GA 120MM C2201

## (undated) DEVICE — SUCTION MANIFOLD NEPTUNE 2 SYS 1 PORT 702-025-000

## (undated) DEVICE — ESU PENCIL W/COATED BLADE E2450H

## (undated) DEVICE — CATH TRAY FOLEY SURESTEP 16FR W/URINE MTR STATLK LF A303416A

## (undated) DEVICE — GOWN LG DISP 9515

## (undated) DEVICE — SUCTION TIP YANKAUER STR K87

## (undated) DEVICE — TUBING CYSTO/BLADDER IRRIG SET 80" 06544-01

## (undated) DEVICE — ESU LIGASURE MARYLAND LAPAROSCOPIC SLR/DVDR 5MMX37CM LF1937

## (undated) DEVICE — RX SURGIFLO HEMOSTATIC MATRIX W/THROMBIN 8ML 2994

## (undated) DEVICE — PAD PERI INDIV WRAP 11" 2022

## (undated) DEVICE — SOL NACL 0.9% IRRIG 1000ML BOTTLE 07138-09

## (undated) RX ORDER — GABAPENTIN 300 MG/1
CAPSULE ORAL
Status: DISPENSED
Start: 2025-02-13

## (undated) RX ORDER — CEFAZOLIN SODIUM/WATER 2 G/20 ML
SYRINGE (ML) INTRAVENOUS
Status: DISPENSED
Start: 2025-02-13

## (undated) RX ORDER — FENTANYL CITRATE 50 UG/ML
INJECTION, SOLUTION INTRAMUSCULAR; INTRAVENOUS
Status: DISPENSED
Start: 2025-02-13

## (undated) RX ORDER — BUPIVACAINE HYDROCHLORIDE AND EPINEPHRINE 2.5; 5 MG/ML; UG/ML
INJECTION, SOLUTION EPIDURAL; INFILTRATION; INTRACAUDAL; PERINEURAL
Status: DISPENSED
Start: 2025-02-13

## (undated) RX ORDER — PROPOFOL 10 MG/ML
INJECTION, EMULSION INTRAVENOUS
Status: DISPENSED
Start: 2025-02-13

## (undated) RX ORDER — MEPERIDINE HYDROCHLORIDE 25 MG/ML
INJECTION INTRAMUSCULAR; INTRAVENOUS; SUBCUTANEOUS
Status: DISPENSED
Start: 2025-02-13

## (undated) RX ORDER — OXYCODONE HYDROCHLORIDE 5 MG/1
TABLET ORAL
Status: DISPENSED
Start: 2025-02-13

## (undated) RX ORDER — METRONIDAZOLE 500 MG/100ML
INJECTION, SOLUTION INTRAVENOUS
Status: DISPENSED
Start: 2025-02-13

## (undated) RX ORDER — ACETAMINOPHEN 325 MG/1
TABLET ORAL
Status: DISPENSED
Start: 2025-02-13

## (undated) RX ORDER — ONDANSETRON 2 MG/ML
INJECTION INTRAMUSCULAR; INTRAVENOUS
Status: DISPENSED
Start: 2025-02-13

## (undated) RX ORDER — PHENAZOPYRIDINE HYDROCHLORIDE 200 MG/1
TABLET, FILM COATED ORAL
Status: DISPENSED
Start: 2025-02-13

## (undated) RX ORDER — DEXAMETHASONE SODIUM PHOSPHATE 4 MG/ML
INJECTION, SOLUTION INTRA-ARTICULAR; INTRALESIONAL; INTRAMUSCULAR; INTRAVENOUS; SOFT TISSUE
Status: DISPENSED
Start: 2025-02-13

## (undated) RX ORDER — KETOROLAC TROMETHAMINE 30 MG/ML
INJECTION, SOLUTION INTRAMUSCULAR; INTRAVENOUS
Status: DISPENSED
Start: 2025-02-13

## (undated) RX ORDER — HYDROMORPHONE HCL IN WATER/PF 6 MG/30 ML
PATIENT CONTROLLED ANALGESIA SYRINGE INTRAVENOUS
Status: DISPENSED
Start: 2025-02-13